# Patient Record
Sex: FEMALE | Race: WHITE | NOT HISPANIC OR LATINO | Employment: UNEMPLOYED | URBAN - METROPOLITAN AREA
[De-identification: names, ages, dates, MRNs, and addresses within clinical notes are randomized per-mention and may not be internally consistent; named-entity substitution may affect disease eponyms.]

---

## 2014-03-27 LAB
CHOLEST SERPL-MCNC: 121 MG/DL
HDLC SERPL-MCNC: 21 MG/DL
LDLC SERPL CALC-MCNC: 79 MG/DL
TRIGL SERPL-MCNC: 105 MG/DL

## 2017-01-06 ENCOUNTER — HOSPITAL ENCOUNTER (EMERGENCY)
Facility: CLINIC | Age: 32
Discharge: HOME | End: 2017-01-06
Attending: PSYCHIATRY & NEUROLOGY | Admitting: PSYCHIATRY & NEUROLOGY
Payer: COMMERCIAL

## 2017-01-06 VITALS
DIASTOLIC BLOOD PRESSURE: 67 MMHG | HEIGHT: 64 IN | RESPIRATION RATE: 16 BRPM | SYSTOLIC BLOOD PRESSURE: 113 MMHG | BODY MASS INDEX: 19.63 KG/M2 | WEIGHT: 115 LBS | TEMPERATURE: 98.1 F | OXYGEN SATURATION: 99 %

## 2017-01-06 DIAGNOSIS — F39 MOOD DISORDER (H): ICD-10-CM

## 2017-01-06 DIAGNOSIS — F10.20 UNCOMPLICATED ALCOHOL DEPENDENCE (H): ICD-10-CM

## 2017-01-06 LAB
ALBUMIN SERPL-MCNC: 3.7 G/DL (ref 3.4–5)
ALCOHOL BREATH TEST: 0 (ref 0–0.01)
ALCOHOL BREATH TEST: 0 (ref 0–0.01)
ALP SERPL-CCNC: 62 U/L (ref 40–150)
ALT SERPL W P-5'-P-CCNC: 17 U/L (ref 0–50)
AMYLASE SERPL-CCNC: 123 U/L (ref 30–110)
ANION GAP SERPL CALCULATED.3IONS-SCNC: 6 MMOL/L (ref 3–14)
AST SERPL W P-5'-P-CCNC: 19 U/L (ref 0–45)
BASOPHILS # BLD AUTO: 0 10E9/L (ref 0–0.2)
BASOPHILS NFR BLD AUTO: 0.3 %
BILIRUB SERPL-MCNC: 0.3 MG/DL (ref 0.2–1.3)
BUN SERPL-MCNC: 9 MG/DL (ref 7–30)
CALCIUM SERPL-MCNC: 8 MG/DL (ref 8.5–10.1)
CHLORIDE SERPL-SCNC: 105 MMOL/L (ref 94–109)
CO2 SERPL-SCNC: 29 MMOL/L (ref 20–32)
CREAT SERPL-MCNC: 0.55 MG/DL (ref 0.52–1.04)
DIFFERENTIAL METHOD BLD: ABNORMAL
EOSINOPHIL # BLD AUTO: 0.4 10E9/L (ref 0–0.7)
EOSINOPHIL NFR BLD AUTO: 4.9 %
ERYTHROCYTE [DISTWIDTH] IN BLOOD BY AUTOMATED COUNT: 15.5 % (ref 10–15)
GFR SERPL CREATININE-BSD FRML MDRD: ABNORMAL ML/MIN/1.7M2
GLUCOSE SERPL-MCNC: 100 MG/DL (ref 70–99)
HCT VFR BLD AUTO: 35.5 % (ref 35–47)
HGB BLD-MCNC: 11.2 G/DL (ref 11.7–15.7)
IMM GRANULOCYTES # BLD: 0 10E9/L (ref 0–0.4)
IMM GRANULOCYTES NFR BLD: 0.3 %
LIPASE SERPL-CCNC: 210 U/L (ref 73–393)
LITHIUM SERPL-SCNC: 0.5 MMOL/L (ref 0.6–1.2)
LYMPHOCYTES # BLD AUTO: 2 10E9/L (ref 0.8–5.3)
LYMPHOCYTES NFR BLD AUTO: 26.8 %
MCH RBC QN AUTO: 28.1 PG (ref 26.5–33)
MCHC RBC AUTO-ENTMCNC: 31.5 G/DL (ref 31.5–36.5)
MCV RBC AUTO: 89 FL (ref 78–100)
MONOCYTES # BLD AUTO: 0.4 10E9/L (ref 0–1.3)
MONOCYTES NFR BLD AUTO: 6 %
NEUTROPHILS # BLD AUTO: 4.5 10E9/L (ref 1.6–8.3)
NEUTROPHILS NFR BLD AUTO: 61.7 %
NRBC # BLD AUTO: 0 10*3/UL
NRBC BLD AUTO-RTO: 0 /100
PLATELET # BLD AUTO: 300 10E9/L (ref 150–450)
POTASSIUM SERPL-SCNC: 4.1 MMOL/L (ref 3.4–5.3)
PROT SERPL-MCNC: 7.4 G/DL (ref 6.8–8.8)
RBC # BLD AUTO: 3.99 10E12/L (ref 3.8–5.2)
SODIUM SERPL-SCNC: 140 MMOL/L (ref 133–144)
WBC # BLD AUTO: 7.3 10E9/L (ref 4–11)

## 2017-01-06 PROCEDURE — 80178 ASSAY OF LITHIUM: CPT | Performed by: PSYCHIATRY & NEUROLOGY

## 2017-01-06 PROCEDURE — 85025 COMPLETE CBC W/AUTO DIFF WBC: CPT | Performed by: PSYCHIATRY & NEUROLOGY

## 2017-01-06 PROCEDURE — 99285 EMERGENCY DEPT VISIT HI MDM: CPT | Mod: 25 | Performed by: PSYCHIATRY & NEUROLOGY

## 2017-01-06 PROCEDURE — 80053 COMPREHEN METABOLIC PANEL: CPT | Performed by: PSYCHIATRY & NEUROLOGY

## 2017-01-06 PROCEDURE — 82150 ASSAY OF AMYLASE: CPT | Performed by: PSYCHIATRY & NEUROLOGY

## 2017-01-06 PROCEDURE — 90791 PSYCH DIAGNOSTIC EVALUATION: CPT

## 2017-01-06 PROCEDURE — 99284 EMERGENCY DEPT VISIT MOD MDM: CPT | Mod: Z6 | Performed by: PSYCHIATRY & NEUROLOGY

## 2017-01-06 PROCEDURE — 36415 COLL VENOUS BLD VENIPUNCTURE: CPT | Performed by: PSYCHIATRY & NEUROLOGY

## 2017-01-06 PROCEDURE — 83690 ASSAY OF LIPASE: CPT | Performed by: PSYCHIATRY & NEUROLOGY

## 2017-01-06 PROCEDURE — 82075 ASSAY OF BREATH ETHANOL: CPT | Performed by: PSYCHIATRY & NEUROLOGY

## 2017-01-06 RX ORDER — LITHIUM CARBONATE 300 MG/1
CAPSULE ORAL
Qty: 90 CAPSULE | Refills: 0 | Status: SHIPPED | OUTPATIENT
Start: 2017-01-06 | End: 2022-02-24

## 2017-01-06 ASSESSMENT — ENCOUNTER SYMPTOMS
DIZZINESS: 0
CHEST TIGHTNESS: 0
SHORTNESS OF BREATH: 0
FEVER: 0
DYSPHORIC MOOD: 1
HALLUCINATIONS: 0
ABDOMINAL PAIN: 1
BACK PAIN: 0
NERVOUS/ANXIOUS: 1

## 2017-01-06 NOTE — ED AVS SNAPSHOT
[facility], [place], Emergency Department    [address]    [facility] [address]    Phone:  [phone]    Fax:  [phone]                                       Amol Franz   MRN: [id number]    Department:  [facility], Emergency Department   Date of Visit:  1/6/2017           After Visit Summary Signature Page     I have received my discharge instructions, and my questions have been answered. I have discussed any challenges I see with this plan with the nurse or doctor.    ..........................................................................................................................................  Patient/Patient Representative Signature      ..........................................................................................................................................  Patient Representative Print Name and Relationship to Patient    ..................................................               ................................................  Date                                            Time    ..........................................................................................................................................  Reviewed by Signature/Title    ...................................................              ..............................................  Date                                                            Time

## 2017-01-06 NOTE — ED AVS SNAPSHOT
[facility], Emergency Department    [address]    [place] [address]    Phone:  [phone]    Fax:  [phone]                                       Amol Franz   MRN: [id number]    Department:  [facility], Emergency Department   Date of Visit:  1/6/2017           Patient Information     Date Of Birth          1985        Your diagnoses for this visit were:     Uncomplicated alcohol dependence (H)     Mood disorder (H)        You were seen by Santos Reed MD.        Discharge Instructions       Go to MICD treatment.  Use the resources given to get into a program.  Call your insurance as they sometimes have the ability to get you into a program.        24 Hour Appointment Hotline       To make an appointment at any [facility] clinic, call [phone] ([phone]). If you don't have a family doctor or clinic, we will help you find one. [facility] clinics are conveniently located to serve the needs of you and your family.             Review of your medicines      Our records show that you are taking the medicines listed below. If these are incorrect, please call your family doctor or clinic.        Dose / Directions Last dose taken    LITHIUM CARBONATE PO   Dose:  300 mg        Take 300 mg by mouth Pt takes 300mg in am and 600mg in evening   Refills:  0                Procedures and tests performed during your visit     Procedure/Test Number of Times Performed    Alcohol breath test POCT 2    Amylase 1    CBC with platelets differential 1    Comprehensive metabolic panel 1    Lipase 1    Lithium level 1      Orders Needing Specimen Collection     Ordered          01/06/17 1957  Drug abuse screen 6 urine (chem dep) - ROUTINE, [facility]: Routine, Needs to be Collected     Scheduled Task Status   01/06/17 1958 Collect Drug abuse screen 6 urine (chem dep) Open   Order Class:  PCU Collect                01/06/17 1957  HCG qualitative urine - STAT, [facility]: STAT, Needs to be Collected      "Scheduled Task Status   17 1958 Collect HCG qualitative urine Open   Order Class:  PCU Collect                  Pending Results     No orders found from 2017 to 2017.            Pending Culture Results     No orders found from 2017 to 2017.            Thank you for choosing [facility]       Thank you for choosing [facility] for your care. Our goal is always to provide you with excellent care. Hearing back from our patients is one way we can continue to improve our services. Please take a few minutes to complete the written survey that you may receive in the mail after you visit with us. Thank you!        [employer] Information     Jumptap lets you send messages to your doctor, view your test results, renew your prescriptions, schedule appointments and more. To sign up, go to [email or web address] . Click on \"Log in\" on the left side of the screen, which will take you to the Welcome page. Then click on \"Sign up Now\" on the right side of the page.     You will be asked to enter the access code listed below, as well as some personal information. Please follow the directions to create your username and password.     Your access code is: [email or web address]  Expires: 2017  8:19 PM     Your access code will  in 90 days. If you need help or a new code, please call your [facility] clinic or [phone].        Care EveryWhere ID     This is your Care EveryWhere ID. This could be used by other organizations to access your [facility] medical records  [id number]        After Visit Summary       This is your record. Keep this with you and show to your community pharmacist(s) and doctor(s) at your next visit.                  "

## 2017-01-07 NOTE — DISCHARGE INSTRUCTIONS
Go to MICD treatment.  Use the resources given to get into a program.  Call your insurance as they sometimes have the ability to get you into a program.    A referral was made to [facility].

## 2017-01-07 NOTE — ED PROVIDER NOTES
History     Chief Complaint   Patient presents with     Addiction Problem     Pt has been drinking for past week. Last drink was at approx 1700. Suicidal ideation, no plan.     Suicidal     The history is provided by the patient and medical records.     Amol Franz is a 31 year old female who comes in due to her feeling that she is losing control.  She states that she is feeling suicidal and that she can't stop using.  She has some sobriety in the past including 2.5 years worth several times.  She relapsed a week ago.  She is drinking.  Her last drink was around 1700 but her breathalyzer is 0.0.  She has no suicidal plan.  She has no withdrawal symptoms.  She feels unsafe in the community and feels hopeless and helpless.      She also states that she thinks she has pancreatitis again.  She has had it 4 times now.  She states she started to feel bloated and tense in her belly after starting to drink again.  She denies any nausea, intense pain or not being able to keep food down.  She states it usually builds from where it feels now and that she usually does not have the anorexia or vomiting with her pancreatitis.      Please see the 's assessment for further details.    I have reviewed the Medications, Allergies, Past Medical and Surgical History, and Social History in the Epic system.    Review of Systems   Constitutional: Negative for fever.   Eyes: Negative for visual disturbance.   Respiratory: Negative for chest tightness and shortness of breath.    Cardiovascular: Negative for chest pain.   Gastrointestinal: Positive for abdominal pain.   Musculoskeletal: Negative for back pain.   Neurological: Negative for dizziness.   Psychiatric/Behavioral: Positive for suicidal ideas and dysphoric mood. Negative for hallucinations and self-injury. The patient is nervous/anxious.    All other systems reviewed and are negative.      Physical Exam   BP: 113/67 mmHg  Heart Rate: 92  Temp: 98.1  F (36.7  C)  Resp:  "16  Height: 162.6 cm (5' 4\")  Weight: 52.164 kg (115 lb)  SpO2: 99 %  Physical Exam   Constitutional: She is oriented to person, place, and time. She appears well-developed and well-nourished.   HENT:   Head: Normocephalic and atraumatic.   Mouth/Throat: Oropharynx is clear and moist.   Eyes: Pupils are equal, round, and reactive to light.   Neck: Normal range of motion. Neck supple.   Cardiovascular: Normal rate, regular rhythm and normal heart sounds.    Pulmonary/Chest: Effort normal and breath sounds normal.   Abdominal: Soft. Bowel sounds are normal. There is no splenomegaly or hepatomegaly. There is tenderness in the right upper quadrant. There is no rigidity, no rebound, no guarding, no tenderness at McBurney's point and negative Cruz's sign.   Musculoskeletal: Normal range of motion.   Neurological: She is alert and oriented to person, place, and time.   Skin: Skin is warm and dry.   Psychiatric: Her speech is normal and behavior is normal. Her mood appears anxious. She is not actively hallucinating. Thought content is not paranoid and not delusional. Cognition and memory are normal. She expresses inappropriate judgment. She exhibits a depressed mood. She expresses suicidal ideation. She expresses no homicidal ideation. She expresses no suicidal plans and no homicidal plans.   Amol is a 30 y/o female who looks her age.  She is well groomed with good eye contact.   Nursing note and vitals reviewed.      ED Course   Procedures        mAol's labs were mostly within normal limits except a slightly high amylase level but nothing that shows that she is having pancreatitis.       Labs Ordered and Resulted from Time of ED Arrival Up to the Time of Departure from the ED   CBC WITH PLATELETS DIFFERENTIAL - Abnormal; Notable for the following:     Hemoglobin 11.2 (*)     RDW 15.5 (*)     All other components within normal limits   LITHIUM LEVEL - Abnormal; Notable for the following:     Lithium Level 0.5 (*)     " All other components within normal limits   ALCOHOL BREATH TEST POCT - Normal   ALCOHOL BREATH TEST POCT - Normal   COMPREHENSIVE METABOLIC PANEL   LIPASE   AMYLASE   DRUG ABUSE SCREEN 6 CHEM DEP URINE ([facility])   HCG QUALITATIVE URINE       Assessments & Plan (with Medical Decision Making)   Amol will be discharged.  She is not an imminent risk to herself or others.  She is higher risk due to her drug use but she admits that she does not want to die and has no suicidal plans.  She is mostly scared about being in the community due to her not being able to stop using.  She was offered for us to call detox facilities including [facility] but she declined.  She was given resources to get into a MICD treatment program.  It was offered to make a referral to our MICD but is not residential and she wants to be in a residential program.  She is medically stable at this point with no withdrawal symptoms.  She has no physical evidence of pancreatitis and no lab evidence either.  If she stays sober, she should feel better.      I have reviewed the nursing notes.    I have reviewed the findings, diagnosis, plan and need for follow up with the patient.  ADDENDUM:  Amol changed her mind and decided she did want the MICD referral to [facility].  One was made.  She was also given a refill of her lithium due to not getting it filled on time.        New Prescriptions    No medications on file       Final diagnoses:   None       1/6/2017   [facility]      Santos Reed MD  01/06/17 2590    Santos Reed MD  01/06/17 6026

## 2017-01-09 ENCOUNTER — TELEPHONE (OUTPATIENT)
Dept: BEHAVIORAL HEALTH | Facility: CLINIC | Age: 32
End: 2017-01-09

## 2017-01-09 NOTE — TELEPHONE ENCOUNTER
D: Writer received a message from pt inquiring about information for either outpt or residential dual Tx. She is hoping to go to whichever she can get into first.     I: Writer LM back for pt. Since that time, a referral from the ED/[facility] have come through.    A: Initially pt reports need for residential Tx and then was open to a referral to outpt DDP prior to leaving the ED.     P: Await call back.    CAROLA Ibarra, [facility]  1/9/2017

## 2017-01-10 ENCOUNTER — TELEPHONE (OUTPATIENT)
Dept: BEHAVIORAL HEALTH | Facility: CLINIC | Age: 32
End: 2017-01-10

## 2017-01-10 NOTE — TELEPHONE ENCOUNTER
D: Pt returned call regarding referral to outpt DDP. Pt reports she has been trying several options for Dual Programs as she has been in about 10 CD Tx's in the past. She is running into either wait lists or inability to take her insurance which is [employer].     I: Writer explained [facility] has a current wait list. There would be a chance of starting her in the 9 am track as everyone is waiting for an 11, however, the next opening is not yet confirmed.    A: Pt reports she only has 2 weeks of TERRA. She can extend this for about one month of Tx, but only if she is able to start something right away. She adds that she is concerned about programming that is only 3 hrs per day. Her kids are staying with their fathers and pt reports she would have too much down time increasing her risk of use. She also wants to avoid going to far away in case something happens with her kids and she needs to be more readily available. Pt reports feeling frustrated and concerned, yet she is also reluctant to compromise what she is looking for.    P: Offered pt a couple of Dual Program options that might meet her needs, however, suggested she first contact [facility] to request a list of providers within her network and to ask them to help her narrow her search. Writer encouraged pt to call back if she changed her mind about wanting to pursue Outpt DDP at .    CAROLA Ibarra, [facility]  1/10/2017

## 2017-03-07 ENCOUNTER — AMBULATORY - HEALTHEAST (OUTPATIENT)
Dept: LAB | Facility: CLINIC | Age: 32
End: 2017-03-07

## 2017-03-07 DIAGNOSIS — D64.9 ANEMIA: ICD-10-CM

## 2017-03-07 DIAGNOSIS — F31.81 BIPOLAR II DISORDER (H): ICD-10-CM

## 2017-03-07 LAB — HBA1C MFR BLD: 5.5 % (ref 3.5–6)

## 2017-03-10 ENCOUNTER — OFFICE VISIT - HEALTHEAST (OUTPATIENT)
Dept: FAMILY MEDICINE | Facility: CLINIC | Age: 32
End: 2017-03-10

## 2017-03-10 ENCOUNTER — AMBULATORY - HEALTHEAST (OUTPATIENT)
Dept: FAMILY MEDICINE | Facility: CLINIC | Age: 32
End: 2017-03-10

## 2017-03-10 ENCOUNTER — HOSPITAL ENCOUNTER (OUTPATIENT)
Dept: LAB | Age: 32
Setting detail: SPECIMEN
Discharge: HOME | End: 2017-03-10

## 2017-03-10 DIAGNOSIS — N39.0 UTI (URINARY TRACT INFECTION): ICD-10-CM

## 2017-03-10 ASSESSMENT — MIFFLIN-ST. JEOR: SCORE: 1223.85

## 2017-03-13 ENCOUNTER — COMMUNICATION - HEALTHEAST (OUTPATIENT)
Dept: FAMILY MEDICINE | Facility: CLINIC | Age: 32
End: 2017-03-13

## 2017-03-15 ENCOUNTER — AMBULATORY - HEALTHEAST (OUTPATIENT)
Dept: LAB | Facility: CLINIC | Age: 32
End: 2017-03-15

## 2017-03-15 DIAGNOSIS — F31.81 BIPOLAR II DISORDER (H): ICD-10-CM

## 2017-05-17 ENCOUNTER — AMBULATORY - HEALTHEAST (OUTPATIENT)
Dept: LAB | Facility: CLINIC | Age: 32
End: 2017-05-17

## 2017-05-17 DIAGNOSIS — F31.60 MIXED BIPOLAR I DISORDER (H): ICD-10-CM

## 2017-05-17 LAB — HBA1C MFR BLD: 5.6 % (ref 3.5–6)

## 2017-07-11 ENCOUNTER — COMMUNICATION - HEALTHEAST (OUTPATIENT)
Dept: SCHEDULING | Facility: CLINIC | Age: 32
End: 2017-07-11

## 2017-07-11 ENCOUNTER — COMMUNICATION - HEALTHEAST (OUTPATIENT)
Dept: FAMILY MEDICINE | Facility: CLINIC | Age: 32
End: 2017-07-11

## 2017-07-12 ENCOUNTER — AMBULATORY - HEALTHEAST (OUTPATIENT)
Dept: FAMILY MEDICINE | Facility: CLINIC | Age: 32
End: 2017-07-12

## 2017-07-12 ENCOUNTER — OFFICE VISIT (OUTPATIENT)
Dept: URGENT CARE | Facility: URGENT CARE | Age: 32
End: 2017-07-12
Payer: COMMERCIAL

## 2017-07-12 VITALS
BODY MASS INDEX: 19.63 KG/M2 | OXYGEN SATURATION: 99 % | DIASTOLIC BLOOD PRESSURE: 60 MMHG | WEIGHT: 115 LBS | TEMPERATURE: 98.4 F | HEIGHT: 64 IN | HEART RATE: 77 BPM | SYSTOLIC BLOOD PRESSURE: 92 MMHG

## 2017-07-12 DIAGNOSIS — R30.0 DYSURIA: Primary | ICD-10-CM

## 2017-07-12 DIAGNOSIS — R82.90 NONSPECIFIC FINDING ON EXAMINATION OF URINE: ICD-10-CM

## 2017-07-12 LAB
ALBUMIN UR-MCNC: NEGATIVE MG/DL
APPEARANCE UR: ABNORMAL
BACTERIA #/AREA URNS HPF: ABNORMAL /HPF
BILIRUB UR QL STRIP: NEGATIVE
COLOR UR AUTO: YELLOW
GLUCOSE UR STRIP-MCNC: NEGATIVE MG/DL
HGB UR QL STRIP: ABNORMAL
KETONES UR STRIP-MCNC: NEGATIVE MG/DL
LEUKOCYTE ESTERASE UR QL STRIP: ABNORMAL
MICRO REPORT STATUS: NORMAL
NITRATE UR QL: POSITIVE
NON-SQ EPI CELLS #/AREA URNS LPF: ABNORMAL /LPF
PH UR STRIP: 5.5 PH (ref 5–7)
RBC #/AREA URNS AUTO: ABNORMAL /HPF (ref 0–2)
SP GR UR STRIP: 1.01 (ref 1–1.03)
SPECIMEN SOURCE: NORMAL
URN SPEC COLLECT METH UR: ABNORMAL
UROBILINOGEN UR STRIP-ACNC: 0.2 EU/DL (ref 0.2–1)
WBC #/AREA URNS AUTO: ABNORMAL /HPF (ref 0–2)
WET PREP SPEC: NORMAL

## 2017-07-12 PROCEDURE — 87088 URINE BACTERIA CULTURE: CPT | Performed by: INTERNAL MEDICINE

## 2017-07-12 PROCEDURE — 87210 SMEAR WET MOUNT SALINE/INK: CPT | Performed by: INTERNAL MEDICINE

## 2017-07-12 PROCEDURE — 87491 CHLMYD TRACH DNA AMP PROBE: CPT | Performed by: INTERNAL MEDICINE

## 2017-07-12 PROCEDURE — 81001 URINALYSIS AUTO W/SCOPE: CPT | Performed by: INTERNAL MEDICINE

## 2017-07-12 PROCEDURE — 99213 OFFICE O/P EST LOW 20 MIN: CPT | Performed by: FAMILY MEDICINE

## 2017-07-12 PROCEDURE — 87086 URINE CULTURE/COLONY COUNT: CPT | Performed by: INTERNAL MEDICINE

## 2017-07-12 PROCEDURE — 87591 N.GONORRHOEAE DNA AMP PROB: CPT | Performed by: INTERNAL MEDICINE

## 2017-07-12 PROCEDURE — 87186 SC STD MICRODIL/AGAR DIL: CPT | Performed by: INTERNAL MEDICINE

## 2017-07-12 RX ORDER — NITROFURANTOIN 25; 75 MG/1; MG/1
100 CAPSULE ORAL 2 TIMES DAILY
Qty: 14 CAPSULE | Refills: 0 | Status: SHIPPED | OUTPATIENT
Start: 2017-07-12 | End: 2017-11-01

## 2017-07-12 NOTE — NURSING NOTE
"Chief Complaint   Patient presents with     Urgent Care     Dysuria      Reports frequent UTI's but not since February.  2 days ago started to have urinary frequency and burning with urination. No unusual vaginal discharge.       Initial BP 92/60  Pulse 77  Temp 98.4  F (36.9  C) (Oral)  Ht 5' 4\" (1.626 m)  Wt 115 lb (52.2 kg)  LMP 07/01/2017 (Exact Date)  SpO2 99%  BMI 19.74 kg/m2 Estimated body mass index is 19.74 kg/(m^2) as calculated from the following:    Height as of this encounter: 5' 4\" (1.626 m).    Weight as of this encounter: 115 lb (52.2 kg)..  BP completed using cuff size: sergio Armenta RN  "

## 2017-07-12 NOTE — MR AVS SNAPSHOT
"              After Visit Summary   2017    Amol Franz    MRN: [id number]           Patient Information     Date Of Birth          1985        Visit Information        Provider Department      2017 5:15 PM Mario Alberto West MD [facility]        Today's Diagnoses     UTI    -  1    Nonspecific finding on examination of urine          Care Instructions    7 days of medication. Take twice daily.               Follow-ups after your visit        Who to contact     If you have questions or need follow up information about today's clinic visit or your schedule please contact [facility] directly at [phone].  Normal or non-critical lab and imaging results will be communicated to you by ePantryhart, letter or phone within 4 business days after the clinic has received the results. If you do not hear from us within 7 days, please contact the clinic through ePantryhart or phone. If you have a critical or abnormal lab result, we will notify you by phone as soon as possible.  Submit refill requests through [employer] or call your pharmacy and they will forward the refill request to us. Please allow 3 business days for your refill to be completed.          Additional Information About Your Visit        MyChart Information     Job[employer] lets you send messages to your doctor, view your test results, renew your prescriptions, schedule appointments and more. To sign up, go to [email or web address] . Click on \"Log in\" on the left side of the screen, which will take you to the Welcome page. Then click on \"Sign up Now\" on the right side of the page.     You will be asked to enter the access code listed below, as well as some personal information. Please follow the directions to create your username and password.     Your access code is: [email or web address]  Expires: 10/10/2017  5:54 PM     Your access code will  in 90 days. If you need help or a new code, please call your " "[facility] or [phone].        Care EveryWhere ID     This is your Care EveryWhere ID. This could be used by other organizations to access your [facility] medical records  [id number]        Your Vitals Were     Pulse Temperature Height Last Period Pulse Oximetry BMI (Body Mass Index)    77 98.4  F (36.9  C) (Oral) 5' 4\" (1.626 m) 07/01/2017 (Exact Date) 99% 19.74 kg/m2       Blood Pressure from Last 3 Encounters:   07/12/17 92/60   01/06/17 113/67   10/25/16 118/66    Weight from Last 3 Encounters:   07/12/17 115 lb (52.2 kg)   01/06/17 115 lb (52.2 kg)   10/25/16 116 lb 12.8 oz (53 kg)              We Performed the Following     *UA reflex to Microscopic and Culture ([facility] and [facility] (except [place] and [place])     Chlamydia trachomatis PCR     Neisseria gonorrhoeae PCR     Urine Culture Aerobic Bacterial     Urine Microscopic     Wet prep          Today's Medication Changes          These changes are accurate as of: 7/12/17  5:54 PM.  If you have any questions, ask your nurse or doctor.               Start taking these medicines.        Dose/Directions    nitroFURantoin (macrocrystal-monohydrate) 100 MG capsule   Commonly known as:  MACROBID   Used for:  Dysuria   Started by:  Mario Alberto West MD        Dose:  100 mg   Take 1 capsule (100 mg) by mouth 2 times daily   Quantity:  14 capsule   Refills:  0            Where to get your medicines      These medications were sent to [facility] - [place], [place] - [address]  [address], [place] [address]     Phone:  [phone]     nitroFURantoin (macrocrystal-monohydrate) 100 MG capsule                Primary Care Provider    Physician No Ref-Primary       No address on file        Equal Access to Services     CASSY BARTON AH: Kimmie Aquino, waaxda luqadaha, qaybta kaalmada adeegyada, livia kelly. So [facility] [phone].    ATENCIÓN: Si kobela esptejal, tiene a mata " disposición servicios gratuitos de asistencia lingüística. Carla pinzon [phone].    We comply with applicable federal civil rights laws and [place] laws. We do not discriminate on the basis of race, color, national origin, age, disability sex, sexual orientation or gender identity.            Thank you!     Thank you for choosing [facility]  for your care. Our goal is always to provide you with excellent care. Hearing back from our patients is one way we can continue to improve our services. Please take a few minutes to complete the written survey that you may receive in the mail after your visit with us. Thank you!             Your Updated Medication List - Protect others around you: Learn how to safely use, store and throw away your medicines at www.disposemymeds.org.          This list is accurate as of: 7/12/17  5:54 PM.  Always use your most recent med list.                   Brand Name Dispense Instructions for use Diagnosis    lithium 300 MG capsule     90 capsule    Pt takes 300mg in am and 600mg in evening        nitroFURantoin (macrocrystal-monohydrate) 100 MG capsule    MACROBID    14 capsule    Take 1 capsule (100 mg) by mouth 2 times daily    Dysuria       TRILEPTAL PO

## 2017-07-12 NOTE — PROGRESS NOTES
"Nursing Evaluation:   Nursing Notes:   Vanessa Sidhu RN  7/12/2017  5:43 PM  Signed  Chief Complaint   Patient presents with     Urgent Care     Dysuria      Reports frequent UTI's but not since February.  2 days ago started to have urinary frequency and burning with urination. No unusual vaginal discharge.       Initial BP 92/60  Pulse 77  Temp 98.4  F (36.9  C) (Oral)  Ht 5' 4\" (1.626 m)  Wt 115 lb (52.2 kg)  LMP 07/01/2017 (Exact Date)  SpO2 99%  BMI 19.74 kg/m2 Estimated body mass index is 19.74 kg/(m^2) as calculated from the following:    Height as of this encounter: 5' 4\" (1.626 m).    Weight as of this encounter: 115 lb (52.2 kg)..  BP completed using cuff size: small regular  FELIX Armenta      Subjective:   Amol Franz is a 31 year old female who presents for   Chief Complaint   Patient presents with     Urgent Care     Dysuria      Reports frequent UTI's but not since February.  2 days ago started to have urinary frequency and burning with urination. No unusual vaginal discharge.     No nausea/vomiting/diarrhea. Symptoms started a couple day ago typical of her other infections which includes increased frequency and slight burning with urination. No blood seen. No fevers. Thinks her fluid intake is pretty good. Eating well.     PMHX/PSHX/MEDS/ALLERGIES/SHX/FHX reviewed and updated in Epic.    There are no active problems to display for this patient.      Current Outpatient Prescriptions   Medication     OXcarbazepine (TRILEPTAL PO)     lithium 300 MG capsule     No current facility-administered medications for this visit.        ROS:  As above per HPI    Objective:   BP 92/60  Pulse 77  Temp 98.4  F (36.9  C) (Oral)  Ht 5' 4\" (1.626 m)  Wt 115 lb (52.2 kg)  LMP 07/01/2017 (Exact Date)  SpO2 99%  BMI 19.74 kg/m2, Body mass index is 19.74 kg/(m^2).  Gen:  NAD, well-nourished, sitting in chair comfortably  HEENT: PERRLA; nares patent; oropharynx pink and moist  Neck: supple without " lymphadenopathy, trachea midline  CV:  Hemodynamically stable  BACK: no CVA tenderness  Pulm: non-labored  ABD: non distended, soft  Extrem: no cyanosis, edema or clubbing  Skin: no obvious rashes or abnormalities  Psych: Euthymic, linear thoughts, normal rate of speech, good judgment       Results for orders placed or performed in visit on 07/12/17   *UA reflex to Microscopic and Culture ([place] and [facility] (except [place] and [place])   Result Value Ref Range    Color Urine Yellow     Appearance Urine Slightly Cloudy     Glucose Urine Negative NEG mg/dL    Bilirubin Urine Negative NEG    Ketones Urine Negative NEG mg/dL    Specific Gravity Urine 1.015 1.003 - 1.035    Blood Urine Small (A) NEG    pH Urine 5.5 5.0 - 7.0 pH    Protein Albumin Urine Negative NEG mg/dL    Urobilinogen Urine 0.2 0.2 - 1.0 EU/dL    Nitrite Urine Positive (A) NEG    Leukocyte Esterase Urine Large (A) NEG    Source Midstream Urine    Urine Microscopic   Result Value Ref Range    WBC Urine  (A) 0 - 2 /HPF    RBC Urine 5-10 (A) 0 - 2 /HPF    Squamous Epithelial /LPF Urine Moderate (A) FEW /LPF    Bacteria Urine Many (A) NEG /HPF   Wet prep   Result Value Ref Range    Specimen Description Vagina     Wet Prep       No Trichomonas seen  No clue cells seen  No yeast seen      Micro Report Status FINAL 07/12/2017        Assesment & Plan:   Amol Franz, 31 year old female who presents with:  (R30.0) UTI  (primary encounter diagnosis)  Comment: Wet prep negative. Positive for Urinary tract infection, no symptoms of pyelonephritis. Macrobid x 7 days. Dysuria very minor, she elects not to take pyridium. Recommended good fluid hydration and to urinate before and after intercourse.   Plan: *UA reflex to Microscopic and Culture ([facility]         and [facility] (except [place] and         [place]), Wet prep, Chlamydia trachomatis PCR,         Neisseria gonorrhoeae PCR, Urine Microscopic            (R82.90) Nonspecific  finding on examination of urine  Comment:   Plan: Urine Culture Aerobic Bacterial     Options for treatment and/or follow-up care were reviewed with the patient. Amol DUTCH Franz and/or legal guardian was engaged and actively involved in the decision making process. Patient/guardian verbalized understanding of the options discussed and was satisfied with the final plan.      Mario Alberto Wets MD PGY3  [phone] (Pager)  [facility]

## 2017-07-13 LAB
C TRACH DNA SPEC QL NAA+PROBE: NORMAL
N GONORRHOEA DNA SPEC QL NAA+PROBE: NORMAL
SPECIMEN SOURCE: NORMAL
SPECIMEN SOURCE: NORMAL

## 2017-07-14 LAB
BACTERIA SPEC CULT: ABNORMAL
MICRO REPORT STATUS: ABNORMAL
MICROORGANISM SPEC CULT: ABNORMAL
SPECIMEN SOURCE: ABNORMAL

## 2017-10-04 ENCOUNTER — OFFICE VISIT - HEALTHEAST (OUTPATIENT)
Dept: INTERNAL MEDICINE | Facility: CLINIC | Age: 32
End: 2017-10-04

## 2017-10-04 DIAGNOSIS — F41.1 ANXIETY STATE: ICD-10-CM

## 2017-10-04 DIAGNOSIS — F10.20 ALCOHOL USE DISORDER, MODERATE, IN CONTROLLED ENVIRONMENT, DEPENDENCE (H): ICD-10-CM

## 2017-10-04 DIAGNOSIS — F31.81 BIPOLAR 2 DISORDER, MAJOR DEPRESSIVE EPISODE (H): ICD-10-CM

## 2017-10-04 ASSESSMENT — MIFFLIN-ST. JEOR: SCORE: 1245.85

## 2017-11-01 ENCOUNTER — OFFICE VISIT (OUTPATIENT)
Dept: URGENT CARE | Facility: URGENT CARE | Age: 32
End: 2017-11-01
Payer: COMMERCIAL

## 2017-11-01 VITALS
OXYGEN SATURATION: 100 % | HEIGHT: 64 IN | BODY MASS INDEX: 20.14 KG/M2 | TEMPERATURE: 98.1 F | WEIGHT: 118 LBS | HEART RATE: 53 BPM | SYSTOLIC BLOOD PRESSURE: 96 MMHG | DIASTOLIC BLOOD PRESSURE: 62 MMHG

## 2017-11-01 DIAGNOSIS — R30.0 DYSURIA: ICD-10-CM

## 2017-11-01 DIAGNOSIS — N30.00 ACUTE CYSTITIS WITHOUT HEMATURIA: Primary | ICD-10-CM

## 2017-11-01 LAB
ALBUMIN UR-MCNC: NEGATIVE MG/DL
APPEARANCE UR: CLEAR
BILIRUB UR QL STRIP: NEGATIVE
COLOR UR AUTO: YELLOW
GLUCOSE UR STRIP-MCNC: NEGATIVE MG/DL
HGB UR QL STRIP: ABNORMAL
KETONES UR STRIP-MCNC: NEGATIVE MG/DL
LEUKOCYTE ESTERASE UR QL STRIP: ABNORMAL
NITRATE UR QL: NEGATIVE
NON-SQ EPI CELLS #/AREA URNS LPF: NORMAL /LPF
PH UR STRIP: 7 PH (ref 5–7)
RBC #/AREA URNS AUTO: NORMAL /HPF
SOURCE: ABNORMAL
SP GR UR STRIP: 1.01 (ref 1–1.03)
UROBILINOGEN UR STRIP-ACNC: 0.2 EU/DL (ref 0.2–1)
WBC #/AREA URNS AUTO: NORMAL /HPF

## 2017-11-01 PROCEDURE — 81001 URINALYSIS AUTO W/SCOPE: CPT | Performed by: INTERNAL MEDICINE

## 2017-11-01 PROCEDURE — 99213 OFFICE O/P EST LOW 20 MIN: CPT | Performed by: INTERNAL MEDICINE

## 2017-11-01 RX ORDER — NITROFURANTOIN 25; 75 MG/1; MG/1
100 CAPSULE ORAL 2 TIMES DAILY
Qty: 10 CAPSULE | Refills: 0 | Status: SHIPPED | OUTPATIENT
Start: 2017-11-01 | End: 2017-11-06

## 2017-11-01 NOTE — PROGRESS NOTES
"SUBJECTIVE:   Amol Franz is a 31 year old female who  presents today for a possible UTI.   Chief Complaint   Patient presents with     Urgent Care     Dysuria     Hx frequent UTI's; started to have urinary frequency and burning the last few days.       Symptoms of dysuria and frequency have been going on for 3day(s).  Hematuria no.  gradual onset    There is no history of fever, chills, nausea or vomiting.  No history of vaginal  discharge. This patient does  have a history of urinary tract infections.   Patient denies flank pain or vaginal discharge     Past Medical History:   Diagnosis Date     Bipolar 1 disorder (H)      Current Outpatient Prescriptions   Medication Sig Dispense Refill     RISPERIDONE PO Take by mouth daily       FLUoxetine HCl (PROZAC PO) Take by mouth daily       nitroFURantoin, macrocrystal-monohydrate, (MACROBID) 100 MG capsule Take 1 capsule (100 mg) by mouth 2 times daily for 5 days 10 capsule 0     OXcarbazepine (TRILEPTAL PO)        lithium 300 MG capsule Pt takes 300mg in am and 600mg in evening (Patient not taking: Reported on 7/12/2017) 90 capsule 0     Social History   Substance Use Topics     Smoking status: Former Smoker     Smokeless tobacco: Current User      Comment: Reports smoking e-cigarettes daily.     Alcohol use 0.0 oz/week     0 Standard drinks or equivalent per week      Comment: has been drinking 1btl plus of wine per day       OBJECTIVE:  BP 96/62  Pulse 53  Temp 98.1  F (36.7  C) (Oral)  Ht 5' 4\" (1.626 m)  Wt 118 lb (53.5 kg)  LMP 10/25/2017 (Approximate)  SpO2 100%  BMI 20.25 kg/m2  GENERAL APPEARANCE: healthy, alert and no distress  ABDOMEN:  soft, nontender  BACK: No CVA tenderness    ASSESSMENT:       ICD-10-CM    1. Acute cystitis without hematuria N30.00 nitroFURantoin, macrocrystal-monohydrate, (MACROBID) 100 MG capsule   2. Dysuria R30.0 *UA reflex to Microscopic and Culture ([facility] (except [place] and [place])     Urine " Microscopic     As symptoms very similar to her UTIs, will treat  Discussed urine is not too remarkable    PLAN:  As per ordered above  Drink plenty of fluids.  Prevention and treatment of UTI's discussed.Signs and symptoms of pyelonephritis mentioned.  Follow up with primary care physician if not improving    Patient Instructions           Component      Latest Ref Rng & Units 11/1/2017   Color Urine       Yellow   Appearance Urine       Clear   Glucose Urine      NEG:Negative mg/dL Negative   Bilirubin Urine      NEG:Negative Negative   Ketones Urine      NEG:Negative mg/dL Negative   Specific Gravity Urine      1.003 - 1.035 1.010   Blood Urine      NEG:Negative Trace (A)   pH Urine      5.0 - 7.0 pH 7.0   Protein Albumin Urine      NEG:Negative mg/dL Negative   Urobilinogen Urine      0.2 - 1.0 EU/dL 0.2   Nitrite Urine      NEG:Negative Negative   Leukocyte Esterase Urine      NEG:Negative Small (A)   Source       Midstream Urine   WBC Urine      OTO2:O - 2 /HPF O - 2   RBC Urine      OTO2:O - 2 /HPF O - 2   Squamous Epithelial /LPF Urine      FEW:Few /LPF Few

## 2017-11-01 NOTE — PATIENT INSTRUCTIONS
Component      Latest Ref Rng & Units 11/1/2017   Color Urine       Yellow   Appearance Urine       Clear   Glucose Urine      NEG:Negative mg/dL Negative   Bilirubin Urine      NEG:Negative Negative   Ketones Urine      NEG:Negative mg/dL Negative   Specific Gravity Urine      1.003 - 1.035 1.010   Blood Urine      NEG:Negative Trace (A)   pH Urine      5.0 - 7.0 pH 7.0   Protein Albumin Urine      NEG:Negative mg/dL Negative   Urobilinogen Urine      0.2 - 1.0 EU/dL 0.2   Nitrite Urine      NEG:Negative Negative   Leukocyte Esterase Urine      NEG:Negative Small (A)   Source       Midstream Urine   WBC Urine      OTO2:O - 2 /HPF O - 2   RBC Urine      OTO2:O - 2 /HPF O - 2   Squamous Epithelial /LPF Urine      FEW:Few /LPF Few

## 2017-11-01 NOTE — MR AVS SNAPSHOT
After Visit Summary   11/1/2017    Amol Franz    MRN: [id number]           Patient Information     Date Of Birth          1985        Visit Information        Provider Department      11/1/2017 5:15 PM Chantal Boothe MD [facility]        Today's Diagnoses     Acute cystitis without hematuria    -  1    Dysuria          Care Instructions          Component      Latest Ref Rng & Units 11/1/2017   Color Urine       Yellow   Appearance Urine       Clear   Glucose Urine      NEG:Negative mg/dL Negative   Bilirubin Urine      NEG:Negative Negative   Ketones Urine      NEG:Negative mg/dL Negative   Specific Gravity Urine      1.003 - 1.035 1.010   Blood Urine      NEG:Negative Trace (A)   pH Urine      5.0 - 7.0 pH 7.0   Protein Albumin Urine      NEG:Negative mg/dL Negative   Urobilinogen Urine      0.2 - 1.0 EU/dL 0.2   Nitrite Urine      NEG:Negative Negative   Leukocyte Esterase Urine      NEG:Negative Small (A)   Source       Midstream Urine   WBC Urine      OTO2:O - 2 /HPF O - 2   RBC Urine      OTO2:O - 2 /HPF O - 2   Squamous Epithelial /LPF Urine      FEW:Few /LPF Few             Follow-ups after your visit        Who to contact     If you have questions or need follow up information about today's clinic visit or your schedule please contact [facility] directly at [phone].  Normal or non-critical lab and imaging results will be communicated to you by MyChart, letter or phone within 4 business days after the clinic has received the results. If you do not hear from us within 7 days, please contact the clinic through MyChart or phone. If you have a critical or abnormal lab result, we will notify you by phone as soon as possible.  Submit refill requests through [employer] or call your pharmacy and they will forward the refill request to us. Please allow 3 business days for your refill to be completed.          Additional Information  "About Your Visit        MyChart Information     [employer] lets you send messages to your doctor, view your test results, renew your prescriptions, schedule appointments and more. To sign up, go to [email or web address] . Click on \"Log in\" on the left side of the screen, which will take you to the Welcome page. Then click on \"Sign up Now\" on the right side of the page.     You will be asked to enter the access code listed below, as well as some personal information. Please follow the directions to create your username and password.     Your access code is: [email or web address]  Expires: 2018  5:46 PM     Your access code will  in 90 days. If you need help or a new code, please call your [facility] clinic or [phone].        Care EveryWhere ID     This is your Care EveryWhere ID. This could be used by other organizations to access your [facility] medical records  [id number]        Your Vitals Were     Pulse Temperature Height Last Period Pulse Oximetry BMI (Body Mass Index)    53 98.1  F (36.7  C) (Oral) 5' 4\" (1.626 m) 10/25/2017 (Approximate) 100% 20.25 kg/m2       Blood Pressure from Last 3 Encounters:   17 96/62   17 92/60   17 113/67    Weight from Last 3 Encounters:   17 118 lb (53.5 kg)   17 115 lb (52.2 kg)   17 115 lb (52.2 kg)              We Performed the Following     *UA reflex to Microscopic and Culture ([place] [facility] (except [place] and [place])     Urine Microscopic          Today's Medication Changes          These changes are accurate as of: 17  5:46 PM.  If you have any questions, ask your nurse or doctor.               Start taking these medicines.        Dose/Directions    nitroFURantoin (macrocrystal-monohydrate) 100 MG capsule   Commonly known as:  MACROBID   Used for:  Acute cystitis without hematuria   Started by:  Chantal Boothe MD        Dose:  100 mg   Take 1 capsule (100 mg) by mouth 2 times daily for 5 days "   Quantity:  10 capsule   Refills:  0            Where to get your medicines      These medications were sent to [facility] - [place], [place] - [address]  [address], [place] [address]     Phone:  [phone]     nitroFURantoin (macrocrystal-monohydrate) 100 MG capsule                Primary Care Provider Office Phone # Fax #    [facility] [phone]       [address] [place] [address]        Equal Access to Services     CASSY BARTON : Hadii aad ku hadasho Soomaali, waaxda luqadaha, qaybta kaalmada adeegyada, waxay idiin hayaan adeeg kharash danish . So [facility] [phone].    ATENCIÓN: Si habla español, tiene a mata disposición servicios gratuitos de asistencia lingüística. Ll[facility] [phone].    We comply with applicable federal civil rights laws and [place] laws. We do not discriminate on the basis of race, color, national origin, age, disability, sex, sexual orientation, or gender identity.            Thank you!     Thank you for choosing [facility]  for your care. Our goal is always to provide you with excellent care. Hearing back from our patients is one way we can continue to improve our services. Please take a few minutes to complete the written survey that you may receive in the mail after your visit with us. Thank you!             Your Updated Medication List - Protect others around you: Learn how to safely use, store and throw away your medicines at www.disposemymeds.org.          This list is accurate as of: 11/1/17  5:46 PM.  Always use your most recent med list.                   Brand Name Dispense Instructions for use Diagnosis    lithium 300 MG capsule     90 capsule    Pt takes 300mg in am and 600mg in evening        nitroFURantoin (macrocrystal-monohydrate) 100 MG capsule    MACROBID    10 capsule    Take 1 capsule (100 mg) by mouth 2 times daily for 5 days    Acute cystitis without hematuria        PROZAC PO      Take by mouth daily        RISPERIDONE PO      Take by mouth daily        TRILEPTAL PO

## 2017-11-01 NOTE — NURSING NOTE
"Chief Complaint   Patient presents with     Urgent Care     Dysuria     Hx frequent UTI's; started to have urinary frequency and burning the last few days.       Initial BP 96/62  Pulse 53  Temp 98.1  F (36.7  C) (Oral)  Ht 5' 4\" (1.626 m)  Wt 118 lb (53.5 kg)  LMP 10/25/2017 (Approximate)  SpO2 100%  BMI 20.25 kg/m2 Estimated body mass index is 20.25 kg/(m^2) as calculated from the following:    Height as of this encounter: 5' 4\" (1.626 m).    Weight as of this encounter: 118 lb (53.5 kg)..  BP completed using cuff size: small robert Armenta RN  "

## 2018-01-28 ENCOUNTER — RECORDS - HEALTHEAST (OUTPATIENT)
Dept: ADMINISTRATIVE | Facility: OTHER | Age: 33
End: 2018-01-28

## 2018-01-29 ENCOUNTER — RECORDS - HEALTHEAST (OUTPATIENT)
Dept: ADMINISTRATIVE | Facility: OTHER | Age: 33
End: 2018-01-29

## 2018-03-24 ENCOUNTER — RECORDS - HEALTHEAST (OUTPATIENT)
Dept: ADMINISTRATIVE | Facility: OTHER | Age: 33
End: 2018-03-24

## 2018-04-17 ENCOUNTER — OFFICE VISIT - HEALTHEAST (OUTPATIENT)
Dept: FAMILY MEDICINE | Facility: CLINIC | Age: 33
End: 2018-04-17

## 2018-04-17 DIAGNOSIS — F43.10 PTSD (POST-TRAUMATIC STRESS DISORDER): ICD-10-CM

## 2018-04-17 DIAGNOSIS — F31.9 BIPOLAR 1 DISORDER, DEPRESSED (H): ICD-10-CM

## 2018-04-17 LAB
ALBUMIN SERPL-MCNC: 3.9 G/DL (ref 3.5–5)
ALP SERPL-CCNC: 60 U/L (ref 45–120)
ALT SERPL W P-5'-P-CCNC: 32 U/L (ref 0–45)
ANION GAP SERPL CALCULATED.3IONS-SCNC: 8 MMOL/L (ref 5–18)
AST SERPL W P-5'-P-CCNC: 34 U/L (ref 0–40)
BASOPHILS # BLD AUTO: 0 THOU/UL (ref 0–0.2)
BASOPHILS NFR BLD AUTO: 0 % (ref 0–2)
BILIRUB SERPL-MCNC: 0.3 MG/DL (ref 0–1)
BUN SERPL-MCNC: 9 MG/DL (ref 8–22)
CALCIUM SERPL-MCNC: 9.2 MG/DL (ref 8.5–10.5)
CHLORIDE BLD-SCNC: 103 MMOL/L (ref 98–107)
CO2 SERPL-SCNC: 29 MMOL/L (ref 22–31)
CREAT SERPL-MCNC: 0.64 MG/DL (ref 0.6–1.1)
EOSINOPHIL # BLD AUTO: 0.3 THOU/UL (ref 0–0.4)
EOSINOPHIL NFR BLD AUTO: 5 % (ref 0–6)
ERYTHROCYTE [DISTWIDTH] IN BLOOD BY AUTOMATED COUNT: 17.8 % (ref 11–14.5)
GFR SERPL CREATININE-BSD FRML MDRD: >60 ML/MIN/1.73M2
GLUCOSE BLD-MCNC: 84 MG/DL (ref 70–125)
HBA1C MFR BLD: 5.2 % (ref 3.5–6)
HCT VFR BLD AUTO: 35.8 % (ref 35–47)
HGB BLD-MCNC: 11.8 G/DL (ref 12–16)
LITHIUM SERPL-SCNC: 0.5 MMOL/L (ref 0.6–1.2)
LYMPHOCYTES # BLD AUTO: 1.7 THOU/UL (ref 0.8–4.4)
LYMPHOCYTES NFR BLD AUTO: 31 % (ref 20–40)
MCH RBC QN AUTO: 26.4 PG (ref 27–34)
MCHC RBC AUTO-ENTMCNC: 32.9 G/DL (ref 32–36)
MCV RBC AUTO: 80 FL (ref 80–100)
MONOCYTES # BLD AUTO: 0.4 THOU/UL (ref 0–0.9)
MONOCYTES NFR BLD AUTO: 7 % (ref 2–10)
NEUTROPHILS # BLD AUTO: 3.1 THOU/UL (ref 2–7.7)
NEUTROPHILS NFR BLD AUTO: 57 % (ref 50–70)
PLATELET # BLD AUTO: 309 THOU/UL (ref 140–440)
PMV BLD AUTO: 6.9 FL (ref 7–10)
POTASSIUM BLD-SCNC: 3.9 MMOL/L (ref 3.5–5)
PROT SERPL-MCNC: 7.3 G/DL (ref 6–8)
RBC # BLD AUTO: 4.46 MILL/UL (ref 3.8–5.4)
SODIUM SERPL-SCNC: 140 MMOL/L (ref 136–145)
TSH SERPL DL<=0.005 MIU/L-ACNC: 1.41 UIU/ML (ref 0.3–5)
WBC: 5.5 THOU/UL (ref 4–11)

## 2018-04-17 ASSESSMENT — MIFFLIN-ST. JEOR: SCORE: 1201.17

## 2018-04-19 ENCOUNTER — COMMUNICATION - HEALTHEAST (OUTPATIENT)
Dept: FAMILY MEDICINE | Facility: CLINIC | Age: 33
End: 2018-04-19

## 2018-05-29 ENCOUNTER — AMBULATORY - HEALTHEAST (OUTPATIENT)
Dept: FAMILY MEDICINE | Facility: CLINIC | Age: 33
End: 2018-05-29

## 2018-05-29 DIAGNOSIS — R30.0 DYSURIA: ICD-10-CM

## 2018-05-31 ENCOUNTER — COMMUNICATION - HEALTHEAST (OUTPATIENT)
Dept: FAMILY MEDICINE | Facility: CLINIC | Age: 33
End: 2018-05-31

## 2018-05-31 ENCOUNTER — AMBULATORY - HEALTHEAST (OUTPATIENT)
Dept: LAB | Facility: CLINIC | Age: 33
End: 2018-05-31

## 2018-05-31 DIAGNOSIS — F31.9 BIPOLAR AFFECTIVE DISORDER (H): ICD-10-CM

## 2018-05-31 DIAGNOSIS — R30.0 DYSURIA: ICD-10-CM

## 2018-05-31 DIAGNOSIS — N39.0 UTI (URINARY TRACT INFECTION): ICD-10-CM

## 2018-05-31 LAB
ALBUMIN UR-MCNC: ABNORMAL MG/DL
ANION GAP SERPL CALCULATED.3IONS-SCNC: 15 MMOL/L (ref 5–18)
APPEARANCE UR: ABNORMAL
BACTERIA #/AREA URNS HPF: ABNORMAL HPF
BILIRUB UR QL STRIP: NEGATIVE
BUN SERPL-MCNC: 10 MG/DL (ref 8–22)
CALCIUM SERPL-MCNC: 9.8 MG/DL (ref 8.5–10.5)
CHLORIDE BLD-SCNC: 102 MMOL/L (ref 98–107)
CO2 SERPL-SCNC: 22 MMOL/L (ref 22–31)
COLOR UR AUTO: YELLOW
CREAT SERPL-MCNC: 0.74 MG/DL (ref 0.6–1.1)
GFR SERPL CREATININE-BSD FRML MDRD: >60 ML/MIN/1.73M2
GLUCOSE BLD-MCNC: 114 MG/DL (ref 70–125)
GLUCOSE UR STRIP-MCNC: NEGATIVE MG/DL
HGB UR QL STRIP: ABNORMAL
KETONES UR STRIP-MCNC: NEGATIVE MG/DL
LEUKOCYTE ESTERASE UR QL STRIP: ABNORMAL
LITHIUM SERPL-SCNC: 0.6 MMOL/L (ref 0.6–1.2)
NITRATE UR QL: NEGATIVE
PH UR STRIP: 7 [PH] (ref 5–8)
POTASSIUM BLD-SCNC: 4 MMOL/L (ref 3.5–5)
RBC #/AREA URNS AUTO: ABNORMAL HPF
SODIUM SERPL-SCNC: 139 MMOL/L (ref 136–145)
SP GR UR STRIP: 1.01 (ref 1–1.03)
SQUAMOUS #/AREA URNS AUTO: ABNORMAL LPF
TRANS CELLS #/AREA URNS HPF: ABNORMAL LPF
TSH SERPL DL<=0.005 MIU/L-ACNC: 0.52 UIU/ML (ref 0.3–5)
UROBILINOGEN UR STRIP-ACNC: ABNORMAL
WBC #/AREA URNS AUTO: ABNORMAL HPF

## 2018-06-03 LAB
BACTERIA SPEC CULT: ABNORMAL
BACTERIA SPEC CULT: ABNORMAL

## 2018-07-09 ENCOUNTER — AMBULATORY - HEALTHEAST (OUTPATIENT)
Dept: ADDICTION MEDICINE | Facility: CLINIC | Age: 33
End: 2018-07-09

## 2018-07-09 ENCOUNTER — OFFICE VISIT - HEALTHEAST (OUTPATIENT)
Dept: ADDICTION MEDICINE | Facility: CLINIC | Age: 33
End: 2018-07-09

## 2018-07-09 DIAGNOSIS — F16.20: ICD-10-CM

## 2018-07-09 DIAGNOSIS — F10.21 ALCOHOL USE DISORDER, SEVERE, IN SUSTAINED REMISSION (H): ICD-10-CM

## 2018-07-09 DIAGNOSIS — F15.20 AMPHETAMINE USE DISORDER, SEVERE (H): ICD-10-CM

## 2018-07-09 DIAGNOSIS — F12.20 CANNABIS USE DISORDER, SEVERE, DEPENDENCE (H): ICD-10-CM

## 2018-07-09 DIAGNOSIS — F13.20 SEDATIVE, HYPNOTIC OR ANXIOLYTIC USE DISORDER, SEVERE, DEPENDENCE (H): ICD-10-CM

## 2018-07-09 DIAGNOSIS — F14.20 COCAINE USE DISORDER, SEVERE, DEPENDENCE (H): ICD-10-CM

## 2018-10-30 ENCOUNTER — AMBULATORY - HEALTHEAST (OUTPATIENT)
Dept: FAMILY MEDICINE | Facility: CLINIC | Age: 33
End: 2018-10-30

## 2018-10-30 DIAGNOSIS — R30.0 DYSURIA: ICD-10-CM

## 2018-10-31 ENCOUNTER — OFFICE VISIT - HEALTHEAST (OUTPATIENT)
Dept: FAMILY MEDICINE | Facility: CLINIC | Age: 33
End: 2018-10-31

## 2018-10-31 DIAGNOSIS — N39.0 RECURRENT UTI: ICD-10-CM

## 2018-10-31 LAB
ALBUMIN UR-MCNC: ABNORMAL MG/DL
APPEARANCE UR: CLEAR
BACTERIA #/AREA URNS HPF: ABNORMAL HPF
BILIRUB UR QL STRIP: NEGATIVE
COLOR UR AUTO: YELLOW
GLUCOSE UR STRIP-MCNC: NEGATIVE MG/DL
HGB UR QL STRIP: ABNORMAL
KETONES UR STRIP-MCNC: NEGATIVE MG/DL
LEUKOCYTE ESTERASE UR QL STRIP: ABNORMAL
NITRATE UR QL: NEGATIVE
PH UR STRIP: 6.5 [PH] (ref 5–8)
RBC #/AREA URNS AUTO: ABNORMAL HPF
SP GR UR STRIP: 1.02 (ref 1–1.03)
SQUAMOUS #/AREA URNS AUTO: ABNORMAL LPF
UROBILINOGEN UR STRIP-ACNC: ABNORMAL
WBC #/AREA URNS AUTO: ABNORMAL HPF

## 2018-11-02 LAB — BACTERIA SPEC CULT: ABNORMAL

## 2019-04-18 ENCOUNTER — OFFICE VISIT (OUTPATIENT)
Dept: URGENT CARE | Facility: URGENT CARE | Age: 34
End: 2019-04-18
Payer: COMMERCIAL

## 2019-04-18 VITALS
SYSTOLIC BLOOD PRESSURE: 121 MMHG | TEMPERATURE: 98.4 F | DIASTOLIC BLOOD PRESSURE: 78 MMHG | BODY MASS INDEX: 23.34 KG/M2 | WEIGHT: 136 LBS | OXYGEN SATURATION: 98 % | HEART RATE: 75 BPM

## 2019-04-18 DIAGNOSIS — L30.9 DERMATITIS: Primary | ICD-10-CM

## 2019-04-18 PROCEDURE — 99213 OFFICE O/P EST LOW 20 MIN: CPT | Performed by: PHYSICIAN ASSISTANT

## 2019-04-18 RX ORDER — CETIRIZINE HYDROCHLORIDE 10 MG/1
10 TABLET ORAL DAILY
Qty: 30 TABLET | Refills: 1 | Status: SHIPPED | OUTPATIENT
Start: 2019-04-18 | End: 2022-02-24

## 2019-04-18 RX ORDER — TRIAMCINOLONE ACETONIDE 1 MG/G
CREAM TOPICAL 3 TIMES DAILY
Qty: 80 G | Refills: 1 | Status: SHIPPED | OUTPATIENT
Start: 2019-04-18 | End: 2022-02-24

## 2019-04-18 NOTE — PROGRESS NOTES
SUBJECTIVE:  Amol Franz is a 33 year old female who presents to the clinic today for a rash.  Onset of rash was 2 day(s) ago.   Rash is gradual onset.  Location of the rash: scalp, elbows, behind arms, feet.  Quality/symptoms of rash: itching   Symptoms are mild and rash seems to be worsening.  Previous history of a similar rash? No  Recent exposure history: none known    Associated symptoms include: none history of OCD. 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 Water intake is minimal. Moisturizer use.    Past Medical History:   Diagnosis Date     Bipolar 1 disorder (H)      Current Outpatient Medications   Medication Sig Dispense Refill     QUEtiapine Fumarate (SEROQUEL PO)        Varenicline Tartrate (CHANTIX PO)        FLUoxetine HCl (PROZAC PO) Take by mouth daily       lithium 300 MG capsule Pt takes 300mg in am and 600mg in evening (Patient not taking: Reported on 7/12/2017) 90 capsule 0     OXcarbazepine (TRILEPTAL PO)        RISPERIDONE PO Take by mouth daily       Social History     Tobacco Use     Smoking status: Former Smoker     Smokeless tobacco: Current User     Tobacco comment: Reports smoking e-cigarettes daily.   Substance Use Topics     Alcohol use: Yes     Alcohol/week: 0.0 oz     Comment: has been drinking 1btl plus of wine per day       ROS:  CONSTITUTIONAL:NEGATIVE for fever, chills, change in weight  INTEGUMENTARY/SKIN: rash left shoulder    EXAM:   /78   Pulse 75   Temp 98.4  F (36.9  C) (Tympanic)   Wt 61.7 kg (136 lb)   SpO2 98%   BMI 23.34 kg/m    GENERAL: alert, no acute distress.  SKIN: Rash description:    Distribution: localized  Location: axilla left posterior and left olecranon process   Color: red,  Lesion type: papular, confluent, scattered discrete lesions with excoriation  GENERAL APPEARANCE: healthy, alert and no distress    ASSESSMENT:      1. Dermatitis    - triamcinolone (KENALOG) 0.1 %  external cream; Apply topically 3 times daily  Dispense: 80 g; Refill: 1  - cetirizine (ZYRTEC) 10 MG tablet; Take 1 tablet (10 mg) by mouth daily  Dispense: 30 tablet; Refill: 1  - Skin Protectants, Misc. (EUCERIN) cream; Apply topically as needed for dry skin  Dispense: 454 g; Refill: 1    PLAN:  1) See today's orders. Increase water intake.   2) Follow-up with primary clinic if not improving over the next week.

## 2019-04-28 ENCOUNTER — OFFICE VISIT (OUTPATIENT)
Dept: URGENT CARE | Facility: URGENT CARE | Age: 34
End: 2019-04-28
Payer: COMMERCIAL

## 2019-04-28 VITALS
HEART RATE: 102 BPM | OXYGEN SATURATION: 98 % | DIASTOLIC BLOOD PRESSURE: 79 MMHG | TEMPERATURE: 98.1 F | SYSTOLIC BLOOD PRESSURE: 104 MMHG | WEIGHT: 136 LBS | BODY MASS INDEX: 23.34 KG/M2

## 2019-04-28 DIAGNOSIS — R07.0 THROAT PAIN: ICD-10-CM

## 2019-04-28 DIAGNOSIS — J20.8 ACUTE VIRAL BRONCHITIS: Primary | ICD-10-CM

## 2019-04-28 LAB
DEPRECATED S PYO AG THROAT QL EIA: NORMAL
SPECIMEN SOURCE: NORMAL

## 2019-04-28 PROCEDURE — 87081 CULTURE SCREEN ONLY: CPT | Performed by: INTERNAL MEDICINE

## 2019-04-28 PROCEDURE — 99213 OFFICE O/P EST LOW 20 MIN: CPT | Performed by: INTERNAL MEDICINE

## 2019-04-28 PROCEDURE — 87880 STREP A ASSAY W/OPTIC: CPT | Performed by: INTERNAL MEDICINE

## 2019-04-28 RX ORDER — ALBUTEROL SULFATE 90 UG/1
2 AEROSOL, METERED RESPIRATORY (INHALATION) 4 TIMES DAILY
Qty: 8.5 G | Refills: 0 | Status: SHIPPED | OUTPATIENT
Start: 2019-04-28 | End: 2022-02-24

## 2019-04-28 ASSESSMENT — ENCOUNTER SYMPTOMS
RHINORRHEA: 1
MYALGIAS: 1
HEADACHES: 0
COUGH: 1
ADENOPATHY: 1
FEVER: 1
SORE THROAT: 1
TROUBLE SWALLOWING: 1
FATIGUE: 1

## 2019-04-28 NOTE — LETTER
[facility]  [address] [facility] [address]  Phone: [phone]    April 28, 2019        Amol Franz  [address] [place] [address]          To whom it may concern:    RE: Amol Franz    Patient was seen and treated today at our clinic.  Please excuse up to 3 days off of work for illness.    Please contact me for questions or concerns.      Sincerely,        Chantal Boothe MD

## 2019-04-28 NOTE — PROGRESS NOTES
SUBJECTIVE:   Amol Franz is a 33 year old female presenting with a chief complaint of   Chief Complaint   Patient presents with     Urgent Care     Pharyngitis     c/o sore throat for 4 days       She is an established patient of [facility].    URI Adult    Onset of symptoms was 4 day(s) ago.  Course of illness is worsening.    Current and Associated symptoms: sore throat  Treatment measures tried include Tylenol/Ibuprofen.  Predisposing factors include ill contact:  and exposure to strep.  Initially she thought her allergies were bothering her.  Sons had sore throat 2 weeks ago      Review of Systems   Constitutional: Positive for fatigue and fever.   HENT: Positive for rhinorrhea, sneezing, sore throat and trouble swallowing.    Respiratory: Positive for cough.    Musculoskeletal: Positive for myalgias.   Neurological: Negative for headaches.   Hematological: Positive for adenopathy.       Past Medical History:   Diagnosis Date     Bipolar 1 disorder (H)      No family history on file.  Current Outpatient Medications   Medication Sig Dispense Refill     albuterol (PROAIR HFA/PROVENTIL HFA/VENTOLIN HFA) 108 (90 Base) MCG/ACT inhaler Inhale 2 puffs into the lungs 4 times daily for 7 days 8.5 g 0     cetirizine (ZYRTEC) 10 MG tablet Take 1 tablet (10 mg) by mouth daily 30 tablet 1     FLUoxetine HCl (PROZAC PO) Take by mouth daily       lithium 300 MG capsule Pt takes 300mg in am and 600mg in evening 90 capsule 0     OXcarbazepine (TRILEPTAL PO)        QUEtiapine Fumarate (SEROQUEL PO)        RISPERIDONE PO Take by mouth daily       Skin Protectants, Misc. (EUCERIN) cream Apply topically as needed for dry skin 454 g 1     triamcinolone (KENALOG) 0.1 % external cream Apply topically 3 times daily 80 g 1     Varenicline Tartrate (CHANTIX PO)        Social History     Tobacco Use     Smoking status: Former Smoker     Smokeless tobacco: Current User     Tobacco comment: Reports smoking e-cigarettes  daily.   Substance Use Topics     Alcohol use: Yes     Alcohol/week: 0.0 oz     Comment: has been drinking 1btl plus of wine per day       OBJECTIVE  /79   Pulse 102   Temp 98.1  F (36.7  C) (Tympanic)   Wt 61.7 kg (136 lb)   LMP 04/21/2019   SpO2 98%   BMI 23.34 kg/m      Physical Exam   Constitutional: She appears well-developed and well-nourished.   HENT:   Mouth/Throat: No oropharyngeal exudate.   Tympanic membranes normal  Oropharynx with postnasal drip   Eyes: Conjunctivae are normal.   Cardiovascular: Normal rate, regular rhythm and normal heart sounds.   Pulmonary/Chest: Effort normal.   Scattered wheezes/rhonchi   Lymphadenopathy:     She has cervical adenopathy.   Vitals reviewed.      Labs:  Results for orders placed or performed in visit on 04/28/19 (from the past 24 hour(s))   Strep, Rapid Screen   Result Value Ref Range    Specimen Description Throat     Rapid Strep A Screen       NEGATIVE: No Group A streptococcal antigen detected by immunoassay, await culture report.       X-Ray was not done.    ASSESSMENT:      ICD-10-CM    1. Acute viral bronchitis J20.8 albuterol (PROAIR HFA/PROVENTIL HFA/VENTOLIN HFA) 108 (90 Base) MCG/ACT inhaler   2. Throat pain R07.0 Strep, Rapid Screen     Beta strep group A culture          PLAN:  No ecigs/tobacco      Followup:    If not improving or if condition worsens, follow up with your Primary Care Provider    Patient Instructions     Your symptoms are consistent with viral bronchitis.    I would like you to use an albuterol inhaler 2 puffs 3-4 times a day.  Rest.  Fluids.  Honey.  Nasal saline gargling.  Ibuprofen.      Component      Latest Ref Rng & Units 4/28/2019   Specimen Description       Throat   Rapid Strep A Screen       NEGATIVE: No Group A streptococcal antigen detected by immunoassay, await culture report.     Throat culture will be done.      Patient Education     Viral Bronchitis (Adult)    You have a viral bronchitis. Bronchitis is  inflammation and swelling of the lining of the lungs. This is often caused by an infection. Symptoms include a dry, hacking cough that is worse at night. The cough may bring up yellow-green mucus. You may also feel short of breath or wheeze. Other symptoms may include tiredness, chest discomfort, and chills.  Bronchitis that is caused by a virus is not treated with antibiotics. Instead, medicines may be given to help relieve symptoms. Symptoms can last up to 2 weeks, although the cough may last much longer.  This illness is contagious during the first few days and is spread through the air by coughing and sneezing, or by direct contact (touching the sick person and then touching your own eyes, nose, or mouth).  Most viral illnesses resolve within 10 to 14 days with rest and simple home remedies, although they may sometimes last for several weeks.  Home care    If symptoms are severe, rest at home for the first 2 to 3 days. When you go back to your usual activities, don't let yourself get too tired.    Do not smoke. Also avoid being exposed to secondhand smoke.    You may use over-the-counter medicine to control fever or pain, unless another pain medicine was prescribed. If you have chronic liver or kidney disease or have ever had a stomach ulcer or gastrointestinal bleeding, talk with your healthcare provider before using these medicines. Also talk to your provider if you are taking medicine to prevent blood clots. Aspirin should never be given to anyone younger than 18 years of age who is ill with a viral infection or fever. It may cause severe liver or brain damage.    Your appetite may be poor, so a light diet is fine. Avoid dehydration by drinking 6 to 8 glasses of fluids per day (such as water, soft drinks, sports drinks, juices, tea, or soup). Extra fluids will help loosen secretions in the nose and lungs.    Over-the-counter cough, cold, and sore-throat medicines will not shorten the length of the illness,  but they may help to reduce symptoms. Don't use decongestants if you have high blood pressure.  Follow-up care  Follow up with your healthcare provider, or as advised. If you had an X-ray or ECG (electrocardiogram), a specialist will review it. You will be notified of any new findings that may affect your care.  If you are age 65 or older, or if you have a chronic lung disease or condition that affects your immune system, or you smoke, ask your healthcare provider about getting a pneumococcal vaccine and a yearly flu shot (influenza vaccine).  When to seek medical advice  Call your healthcare provider right away if any of these occur:    Fever of 100.4 F (38 C) or higher, or as directed by your healthcare provider    Coughing up increased amounts of colored sputum    Weakness, drowsiness, headache, facial pain, ear pain, or a stiff neck  Call 911  Call 911 if any of these occur:    Coughing up blood    Worsening weakness, drowsiness, headache, or stiff neck    Trouble breathing, wheezing, or pain with breathing  Date Last Reviewed: 6/1/2018 2000-2018 [employer]. [address] [place] [address]. All rights reserved. This information is not intended as a substitute for professional medical care. Always follow your healthcare professional's instructions.

## 2019-04-28 NOTE — PATIENT INSTRUCTIONS
Your symptoms are consistent with viral bronchitis.    I would like you to use an albuterol inhaler 2 puffs 3-4 times a day.  Rest.  Fluids.  Honey.  Nasal saline gargling.  Ibuprofen.      Component      Latest Ref Rng & Units 4/28/2019   Specimen Description       Throat   Rapid Strep A Screen       NEGATIVE: No Group A streptococcal antigen detected by immunoassay, await culture report.     Throat culture will be done.      Patient Education     Viral Bronchitis (Adult)    You have a viral bronchitis. Bronchitis is inflammation and swelling of the lining of the lungs. This is often caused by an infection. Symptoms include a dry, hacking cough that is worse at night. The cough may bring up yellow-green mucus. You may also feel short of breath or wheeze. Other symptoms may include tiredness, chest discomfort, and chills.  Bronchitis that is caused by a virus is not treated with antibiotics. Instead, medicines may be given to help relieve symptoms. Symptoms can last up to 2 weeks, although the cough may last much longer.  This illness is contagious during the first few days and is spread through the air by coughing and sneezing, or by direct contact (touching the sick person and then touching your own eyes, nose, or mouth).  Most viral illnesses resolve within 10 to 14 days with rest and simple home remedies, although they may sometimes last for several weeks.  Home care    If symptoms are severe, rest at home for the first 2 to 3 days. When you go back to your usual activities, don't let yourself get too tired.    Do not smoke. Also avoid being exposed to secondhand smoke.    You may use over-the-counter medicine to control fever or pain, unless another pain medicine was prescribed. If you have chronic liver or kidney disease or have ever had a stomach ulcer or gastrointestinal bleeding, talk with your healthcare provider before using these medicines. Also talk to your provider if you are taking medicine to prevent  blood clots. Aspirin should never be given to anyone younger than 18 years of age who is ill with a viral infection or fever. It may cause severe liver or brain damage.    Your appetite may be poor, so a light diet is fine. Avoid dehydration by drinking 6 to 8 glasses of fluids per day (such as water, soft drinks, sports drinks, juices, tea, or soup). Extra fluids will help loosen secretions in the nose and lungs.    Over-the-counter cough, cold, and sore-throat medicines will not shorten the length of the illness, but they may help to reduce symptoms. Don't use decongestants if you have high blood pressure.  Follow-up care  Follow up with your healthcare provider, or as advised. If you had an X-ray or ECG (electrocardiogram), a specialist will review it. You will be notified of any new findings that may affect your care.  If you are age 65 or older, or if you have a chronic lung disease or condition that affects your immune system, or you smoke, ask your healthcare provider about getting a pneumococcal vaccine and a yearly flu shot (influenza vaccine).  When to seek medical advice  Call your healthcare provider right away if any of these occur:    Fever of 100.4 F (38 C) or higher, or as directed by your healthcare provider    Coughing up increased amounts of colored sputum    Weakness, drowsiness, headache, facial pain, ear pain, or a stiff neck  Call 911  Call 911 if any of these occur:    Coughing up blood    Worsening weakness, drowsiness, headache, or stiff neck    Trouble breathing, wheezing, or pain with breathing  Date Last Reviewed: 6/1/2018 2000-2018 [employer]. [address] [place] [address], [place] [address]. All rights reserved. This information is not intended as a substitute for professional medical care. Always follow your healthcare professional's instructions.

## 2019-04-29 LAB
BACTERIA SPEC CULT: NORMAL
SPECIMEN SOURCE: NORMAL

## 2019-05-01 ENCOUNTER — NURSE TRIAGE (OUTPATIENT)
Dept: NURSING | Facility: CLINIC | Age: 34
End: 2019-05-01

## 2019-05-01 ENCOUNTER — OFFICE VISIT (OUTPATIENT)
Dept: URGENT CARE | Facility: URGENT CARE | Age: 34
End: 2019-05-01
Payer: COMMERCIAL

## 2019-05-01 VITALS
HEART RATE: 90 BPM | OXYGEN SATURATION: 96 % | HEIGHT: 64 IN | DIASTOLIC BLOOD PRESSURE: 64 MMHG | WEIGHT: 130 LBS | TEMPERATURE: 98.9 F | RESPIRATION RATE: 14 BRPM | SYSTOLIC BLOOD PRESSURE: 100 MMHG | BODY MASS INDEX: 22.2 KG/M2

## 2019-05-01 DIAGNOSIS — H66.001 RIGHT ACUTE SUPPURATIVE OTITIS MEDIA: ICD-10-CM

## 2019-05-01 DIAGNOSIS — J11.1 BRONCHITIS WITH INFLUENZA: Primary | ICD-10-CM

## 2019-05-01 PROCEDURE — 99214 OFFICE O/P EST MOD 30 MIN: CPT | Performed by: FAMILY MEDICINE

## 2019-05-01 RX ORDER — DOXYCYCLINE 100 MG/1
100 CAPSULE ORAL 2 TIMES DAILY
Qty: 20 CAPSULE | Refills: 0 | Status: SHIPPED | OUTPATIENT
Start: 2019-05-01 | End: 2019-05-11

## 2019-05-01 ASSESSMENT — MIFFLIN-ST. JEOR: SCORE: 1274.68

## 2019-05-01 NOTE — LETTER
[facility]  [address] [facility] [id number]  [phone]      May 1, 2019    RE:  Amol Franz                                                                                                                                                       [address] [place] [address]            To whom it may concern:    Amol Franz is under my professional care for    Bronchitis with influenza  Right acute suppurative otitis media.    May return to work   with no restrictions when shortness of breath, severe cough, fevers and chills are resolved.  Influenza is usually infectious for 7-10 days.        Sincerely,        Tabby Locke MD    [facility]

## 2019-05-02 PROBLEM — F10.20 ALCOHOL USE DISORDER, SEVERE, DEPENDENCE (H): Status: ACTIVE | Noted: 2018-07-03

## 2019-05-02 PROBLEM — F50.9 EATING DISORDER: Status: ACTIVE | Noted: 2018-03-16

## 2019-05-02 PROBLEM — T56.892A: Status: ACTIVE | Noted: 2017-01-14

## 2019-05-02 PROBLEM — F12.20 CANNABIS DEPENDENCE (H): Status: ACTIVE | Noted: 2018-07-03

## 2019-05-02 PROBLEM — F31.9 BIPOLAR 1 DISORDER, DEPRESSED (H): Status: ACTIVE | Noted: 2018-04-17

## 2019-05-02 PROBLEM — F19.94 DRUG-INDUCED MOOD DISORDER (H): Status: ACTIVE | Noted: 2018-07-03

## 2019-05-02 PROBLEM — F31.13 BIPOLAR DISORDER WITH SEVERE MANIA (H): Status: ACTIVE | Noted: 2019-05-02

## 2019-05-02 PROBLEM — F11.20 OPIOID USE DISORDER, SEVERE, DEPENDENCE (H): Status: ACTIVE | Noted: 2019-05-02

## 2019-05-02 PROBLEM — F43.10 PTSD (POST-TRAUMATIC STRESS DISORDER): Status: ACTIVE | Noted: 2018-04-17

## 2019-05-02 PROBLEM — F41.1 ANXIETY STATE: Status: ACTIVE | Noted: 2018-07-03

## 2019-05-02 NOTE — PROGRESS NOTES
SUBJECTIVE:   Chief Complaint   Patient presents with     Urgent Care     URI     diagnosed with viral bronchitis, sick x 8 days.      Ear Problem     right side ear pain. can barely hear out of it.      Amol Franz is a 33 year old female who present complaining of flu-like symptoms: fevers, chills, myalgias, headache,  congestion, sore throat and cough for 8 days.   Reports some  dyspnea / little wheezing.  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  She was evaluated in clinic 4 days ago and diagnosed with viral URI- given Rx for albuterol inhaler-  Strep was negative.    She still feels ill and does not feel she can handle return to work-  Needs note    Has pain right ear    Patient Active Problem List   Diagnosis     Alcohol use disorder, severe, dependence (H)     Anxiety state     Bipolar 1 disorder, depressed (H)     Bipolar 1 disorder, mixed, severe (H)     Bipolar disorder with severe cb (H)     Cannabis dependence (H)     Drug-induced mood disorder (H)     Eating disorder     Intentional lithium overdose (H)     Opioid use disorder, severe, dependence (H)     Other known or suspected fetal abnormality, not elsewhere classified, affecting management of mother, antepartum condition or complication     PTSD (post-traumatic stress disorder)     Supervision of high-risk pregnancy       ALLERGIES:     Allergies   Allergen Reactions     Bactrim [Sulfamethoxazole W/Trimethoprim] Other (See Comments)     pancreatitis     Mirtazapine      Weight gain, polyphagia, not feeling well in general     Nuts Unknown         Current Outpatient Medications on File Prior to Visit:  albuterol (PROAIR HFA/PROVENTIL HFA/VENTOLIN HFA) 108 (90 Base) MCG/ACT inhaler Inhale 2 puffs into the lungs 4 times daily for 7 days   QUEtiapine Fumarate (SEROQUEL PO)    cetirizine (ZYRTEC) 10 MG tablet Take 1 tablet (10 mg) by mouth daily (Patient not taking: Reported on 5/1/2019)   FLUoxetine HCl  "(PROZAC PO) Take by mouth daily   lithium 300 MG capsule Pt takes 300mg in am and 600mg in evening (Patient not taking: Reported on 5/1/2019)   OXcarbazepine (TRILEPTAL PO)    RISPERIDONE PO Take by mouth daily   Skin Protectants, Misc. (EUCERIN) cream Apply topically as needed for dry skin   triamcinolone (KENALOG) 0.1 % external cream Apply topically 3 times daily   Varenicline Tartrate (CHANTIX PO)      No current facility-administered medications on file prior to visit.     Social History     Tobacco Use     Smoking status: Former Smoker     Smokeless tobacco: Current User     Tobacco comment: Reports smoking e-cigarettes daily.   Substance Use Topics     Alcohol use: Yes     Alcohol/week: 0.0 oz     Comment: has been drinking 1btl plus of wine per day     Family History:  Non-contributory,  No associated family health conditions    ROS:  CONSTITUTIONAL:N  fever, chills, c   INTEGUMENTARY/SKIN: NEGATIVE for worrisome rashes,    EYES: NEGATIVE for vision changes or irritation  GI: NEGATIVE for nausea, abdominal pain      OBJECTIVE  :/64   Pulse 90   Temp 98.9  F (37.2  C) (Oral)   Resp 14   Ht 1.626 m (5' 4\")   Wt 59 kg (130 lb)   LMP 04/21/2019   SpO2 96%   Breastfeeding? No   BMI 22.31 kg/m    GENERAL APPEARANCE: alert, moderate distress, flushed and fatigued,  Frequent congested cough  EYES: EOMI,  PERRL, conjunctiva clear  HENT:  ear canals and Left TM  normal.  Right TM with erythema, bulging, purulence behind the TM   Nose and mouth without ulcers, erythema or lesions  NECK: supple, nontender, no lymphadenopathy  RESP: rhonchi little bilateral and no rales or wheezes  CV: regular rates and rhythm, normal S1 S2, no murmur noted  NEURO: Normal strength and tone, sensory exam grossly normal,  normal speech and mentation  SKIN: no suspicious lesions or rashes          ASSESSMENT:  Bronchitis with influenza     - doxycycline hyclate (VIBRAMYCIN) 100 MG capsule; Take 1 capsule (100 mg) by mouth 2 " times daily for 10 days    We discussed the limitations of influenza testing and limitations of  antiviral therapy against influenza, that treatment should usually be initiated within 2 days of the start of symptoms and is most critical for persons with weak immunity and chronic respiratory illnesses-  No tamiflu at this stage     Symptomatic therapy suggested:  Rest, drink lots of fluids,  Acetaminophen / ibuprofen for body aches and fever,  Salt water gargles for sore throat,  OTC anesthetic lozenges for sore throat,  OTC cough medications.   Call or return to clinic prn if these symptoms worsen or fail to improve as anticipated.    Treatment and prophylaxis for close contacts  was discussed  Advised that influenza illness usually lasts a week, sometimes more and that the patient should avoid contact with others, and cover the mouth when coughing to limit spread of the infection.    Discussed that influenza and similar illnesses are  potent viruses  that can cause damage to airways making increased risk for developing bronchitis or pneumonia.  In severe cases of chest symptoms an  antibiotic can treat the bacterial superinfection, but I explained that the antibiotic is not effective against the influenza or other respiratory viruses.    Note for work     Right acute suppurative otitis media     - doxycycline hyclate (VIBRAMYCIN) 100 MG capsule; Take 1 capsule (100 mg) by mouth 2 times daily for 10 days     Symptomatic treatment with acetaminophen or Ibuprofen as needed for pain

## 2019-06-05 ENCOUNTER — OFFICE VISIT (OUTPATIENT)
Dept: URGENT CARE | Facility: URGENT CARE | Age: 34
End: 2019-06-05
Payer: COMMERCIAL

## 2019-06-05 VITALS
TEMPERATURE: 99.1 F | OXYGEN SATURATION: 98 % | BODY MASS INDEX: 22.49 KG/M2 | SYSTOLIC BLOOD PRESSURE: 108 MMHG | WEIGHT: 131 LBS | DIASTOLIC BLOOD PRESSURE: 64 MMHG | HEART RATE: 87 BPM

## 2019-06-05 DIAGNOSIS — L30.9 DERMATITIS: Primary | ICD-10-CM

## 2019-06-05 PROCEDURE — 99213 OFFICE O/P EST LOW 20 MIN: CPT | Performed by: INTERNAL MEDICINE

## 2019-06-05 RX ORDER — TRIAMCINOLONE ACETONIDE 1 MG/G
CREAM TOPICAL 2 TIMES DAILY
Qty: 45 G | Refills: 0 | Status: SHIPPED | OUTPATIENT
Start: 2019-06-05 | End: 2022-02-24

## 2019-06-05 RX ORDER — PREDNISONE 20 MG/1
40 TABLET ORAL DAILY
Qty: 10 TABLET | Refills: 0 | Status: SHIPPED | OUTPATIENT
Start: 2019-06-05 | End: 2019-06-10

## 2019-06-05 NOTE — PATIENT INSTRUCTIONS
Prednisone 40 mg 5 day burst  Topical steroid cream 2 x day    Cool compress, Zyrtec morning, benadryl night for itch      See Dermatology   Schedule next available appointment   May cancel if improved

## 2019-06-05 NOTE — PROGRESS NOTES
SUBJECTIVE:   Amol Franz is a 33 year old female presenting with a chief complaint of   Chief Complaint   Patient presents with     Urgent Care     Pt in clinic c/o rash under breasts.     Derm Problem       She is an established patient of [facility].    4/18 - dx dermatitis - this was on her elbows and armpits.  She states it still itches in these areas    4/28 - viral bronchitis  Seen 5/1 for bronchits/otitis media   treatment for doxycline completed    On antibiotics currently for UTI that she started yesterday    Now with rash with under both breast -started few weeks ago  Red very itchy.   ? shingles  treatment -avoiding lotions    Has underwire bra.      Itchy around elbows.  Skin colored bumps on elbow    Denies excessive sweating   does not exercise    Review of Systems    Past Medical History:   Diagnosis Date     Bipolar 1 disorder (H)      No family history on file.  Current Outpatient Medications   Medication Sig Dispense Refill     levonorgestrel (MIRENA) 20 MCG/24HR IUD 1 each by Intrauterine route once       NITROFURANTOIN PO        predniSONE (DELTASONE) 20 MG tablet Take 2 tablets (40 mg) by mouth daily for 5 days 10 tablet 0     QUEtiapine Fumarate (SEROQUEL PO)        triamcinolone (KENALOG) 0.1 % external cream Apply topically 2 times daily 45 g 0     albuterol (PROAIR HFA/PROVENTIL HFA/VENTOLIN HFA) 108 (90 Base) MCG/ACT inhaler Inhale 2 puffs into the lungs 4 times daily for 7 days 8.5 g 0     cetirizine (ZYRTEC) 10 MG tablet Take 1 tablet (10 mg) by mouth daily (Patient not taking: Reported on 5/1/2019) 30 tablet 1     FLUoxetine HCl (PROZAC PO) Take by mouth daily       lithium 300 MG capsule Pt takes 300mg in am and 600mg in evening (Patient not taking: Reported on 5/1/2019) 90 capsule 0     OXcarbazepine (TRILEPTAL PO)        RISPERIDONE PO Take by mouth daily       Skin Protectants, Misc. (EUCERIN) cream Apply topically as needed for dry skin (Patient not taking: Reported on 6/5/2019)  454 g 1     triamcinolone (KENALOG) 0.1 % external cream Apply topically 3 times daily (Patient not taking: Reported on 6/5/2019) 80 g 1     Varenicline Tartrate (CHANTIX PO)        Social History     Tobacco Use     Smoking status: Current Every Day Smoker     Smokeless tobacco: Current User     Tobacco comment: Reports smoking e-cigarettes daily.   Substance Use Topics     Alcohol use: Yes     Alcohol/week: 0.0 oz     Comment: has been drinking 1btl plus of wine per day       OBJECTIVE  /64   Pulse 87   Temp 99.1  F (37.3  C) (Oral)   Wt 59.4 kg (131 lb)   SpO2 98%   BMI 22.49 kg/m      Physical Exam   Constitutional: She appears well-developed and well-nourished.   Skin:   Skin colored bumps bilateral near elbows.  No rash near axilla noted     Patient has bilateral symmetrical rash on chest to that includes sternal area and inner thirds of skin under breast.  Also include slight area of the breast itself    Skin feels dry rough red and thickened   Vitals reviewed.      Labs:  No results found for this or any previous visit (from the past 24 hour(s)).        ASSESSMENT:      ICD-10-CM    1. Dermatitis L30.9 predniSONE (DELTASONE) 20 MG tablet     triamcinolone (KENALOG) 0.1 % external cream        Medical Decision Making:    Differential Diagnosis:  Discussed with patient rash could be related to dermatitis reaction to underwire bra, dry skin.  Also discussed could potentially be fungus/yeast infection  Discussed with patient that oral steroid and topical steroid cream can make a yeast/fungal infection worse.  Discussed treatments are different      Patient Instructions   Prednisone 40 mg 5 day burst  Topical steroid cream 2 x day    Cool compress, Zyrtec morning, benadryl night for itch      See Dermatology   Schedule next available appointment   May cancel if improved

## 2019-07-21 ENCOUNTER — OFFICE VISIT (OUTPATIENT)
Dept: URGENT CARE | Facility: URGENT CARE | Age: 34
End: 2019-07-21
Payer: COMMERCIAL

## 2019-07-21 VITALS
DIASTOLIC BLOOD PRESSURE: 62 MMHG | BODY MASS INDEX: 22.2 KG/M2 | RESPIRATION RATE: 12 BRPM | TEMPERATURE: 98.3 F | SYSTOLIC BLOOD PRESSURE: 106 MMHG | HEART RATE: 80 BPM | HEIGHT: 64 IN | WEIGHT: 130 LBS

## 2019-07-21 DIAGNOSIS — S01.81XA CHIN LACERATION, INITIAL ENCOUNTER: Primary | ICD-10-CM

## 2019-07-21 PROCEDURE — 12011 RPR F/E/E/N/L/M 2.5 CM/<: CPT | Performed by: INTERNAL MEDICINE

## 2019-07-21 ASSESSMENT — MIFFLIN-ST. JEOR: SCORE: 1274.68
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
## 2019-07-21 NOTE — PATIENT INSTRUCTIONS
Declines update on tetanus today  Will consider with primary clinic  Watch for signs infection    Call or return to clinic if symptoms worsen or fail to improve as anticipated.      Patient Education     Laceration, Chin, Skin Glue Repair  A laceration is a cut through the skin. If you have a chin laceration, skin glue may be used to repair the cut. Skin glue is usually used on cuts that are shallow, have smooth edges, and are not infected. A lower layer of skin may be closed with sutures before skin glue is applied. Skin glue provides a water-resistant covering that allows for fast healing. No bandages are required and skin glue peels off on its own within 5 to 10 days.  Home care  Medicines:  Acetaminophen or ibuprofen may be taken for pain, unless another pain medicine was prescribed. If you have chronic liver or kidney disease, talk with your doctor before using these medicines. Also talk with your doctor if you have ever had a stomach ulcer or gastrointestinal bleeding.  General care    Keep the wound clean and dry. You may shower or bathe as usual, but do not use soaps, lotions, or ointments on the wound area. Do not scrub the wound. After bathing, pat the wound dry with a soft towel.    Avoid soaking the laceration in water for 7 to 10 days. Use a clean cloth to gently pat the area dry if it gets wet.    Do not scratch, rub, or pick at the skin glue. Do not place tape directly over the skin glue.    Do not apply liquids (such as peroxide), ointments, or creams to the wound while the skin glue is in place.    If the adhesive does not peel off after 10 days, apply petroleum jelly or an antibiotic ointment to the area.    Most chin wounds heal without problems. However, an infection sometimes occurs despite proper treatment. Therefore, watch for the signs of infection listed below.    Certain types of skin glues cannot be used if you have an allergy to latex or formaldehyde. Tell your doctor right away about  your allergies.  Follow-up care  Follow up with your healthcare provider, or as advised. The skin glue will fall off naturally in 5 to 10 days.  When to seek medical advice  Call your healthcare provider right away if any of these occur:    Signs of infection:  ? Fever of 100.4 F (38 C) or higher, or as directed by your health care provider  ? Increasing pain in the wound  ? Increasing redness or swelling  ? Pus coming from the wound    Wound bleeds more than a small amount or bleeding doesn t stop    Wound edges come apart  Date Last Reviewed: 9/1/2016 2000-2018 [employer]. [address] [place], [place] [id number]. All rights reserved. This information is not intended as a substitute for professional medical care. Always follow your healthcare professional's instructions.

## 2019-10-11 ENCOUNTER — ANCILLARY PROCEDURE (OUTPATIENT)
Dept: GENERAL RADIOLOGY | Facility: CLINIC | Age: 34
End: 2019-10-11
Attending: NURSE PRACTITIONER
Payer: COMMERCIAL

## 2019-10-11 ENCOUNTER — OFFICE VISIT (OUTPATIENT)
Dept: URGENT CARE | Facility: URGENT CARE | Age: 34
End: 2019-10-11
Payer: COMMERCIAL

## 2019-10-11 VITALS
BODY MASS INDEX: 22.31 KG/M2 | TEMPERATURE: 98 F | RESPIRATION RATE: 16 BRPM | HEART RATE: 110 BPM | DIASTOLIC BLOOD PRESSURE: 84 MMHG | SYSTOLIC BLOOD PRESSURE: 116 MMHG | HEIGHT: 64 IN

## 2019-10-11 DIAGNOSIS — S69.92XA INJURY OF FINGER OF LEFT HAND, INITIAL ENCOUNTER: Primary | ICD-10-CM

## 2019-10-11 DIAGNOSIS — T74.91XA DOMESTIC VIOLENCE OF ADULT, INITIAL ENCOUNTER: ICD-10-CM

## 2019-10-11 DIAGNOSIS — S00.81XA CHIN ABRASION, NON-INFECTED: ICD-10-CM

## 2019-10-11 DIAGNOSIS — S69.92XA INJURY OF FINGER OF LEFT HAND, INITIAL ENCOUNTER: ICD-10-CM

## 2019-10-11 PROCEDURE — 73140 X-RAY EXAM OF FINGER(S): CPT | Mod: LT

## 2019-10-11 PROCEDURE — 99213 OFFICE O/P EST LOW 20 MIN: CPT | Performed by: NURSE PRACTITIONER
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
## 2019-10-11 NOTE — PATIENT INSTRUCTIONS
Patient Education     Finger Sprain  A sprain is a stretching or tearing of the ligaments that hold a joint together. There are no broken bones. Sprains take 3 to 6 weeks or more to heal.  A sprained finger may be treated with a splint or buddy tape. This is when you tape the injured finger to the one next to it for support. Minor sprains may require no additional support.  Home care    Keep your hand elevated to reduce pain and swelling. This is very important during the first 48 hours.    Apply an ice pack over the injured area for 15 to 20 minutes every 3 to 6 hours. You should do this for the first 24 to 48 hours. You can make an ice pack by filling a plastic bag that seals at the top with ice cubes and then wrapping it with a thin towel. Continue the use of ice packs for relief of pain and swelling as needed. As the ice melts, be careful to avoid getting any wrap or splint wet. After 48 hours, apply heat (warm shower or warm bath) for 15 to 20 minutes several times a day, or alternate ice and heat.    If buddy tape was applied and it becomes wet or dirty, change it. You may replace it with paper, plastic or cloth tape. Cloth tape and paper tapes must be kept dry. Apply gauze or cotton padding between the fingers, especially at the webbed space. This will help prevent the skin from getting moist and breaking down. Keep the buddy tape in place for at least 4 weeks, or as instructed by your healthcare provider.    If a splint was applied, wear it for the time advised.    You may use over-the-counter pain medicine to control pain, unless another pain medicine was prescribed. If you have chronic liver or kidney disease or ever had a stomach ulcer or gastrointestinal bleeding, talk with your healthcare provider before using these medicines.  Follow-up care  Follow up with your healthcare provider, or as directed. Finger joints will become stiff if immobile for too long. If a splint was applied, ask your healthcare  provider when it is safe to begin range-of-motion exercises.  Sometimes fractures don t show up on the first X-ray. Bruises and sprains can sometimes hurt as much as a fracture. These injuries can take time to heal completely. If your symptoms don t improve or they get worse, talk with your healthcare provider. You may need a repeat X-ray. If X-rays were taken, you will be told of any new findings that may affect your care.  When to seek medical advice  Call your healthcare provider right away if any of these occur:    Pain or swelling increases    Fingers or hand becomes cold, blue, numb, or tingly  Date Last Reviewed: 5/1/2018 2000-2018 [employer]. [address] [place] [address], [place] [address]. All rights reserved. This information is not intended as a substitute for professional medical care. Always follow your healthcare professional's instructions.

## 2019-10-21 NOTE — PATIENT INSTRUCTIONS
Patient Education     Influenza (Adult)    Influenza is also called the flu. It is a viral illness that affects the air passages of your lungs. It is different from the common cold. The flu can easily be passed from one to person to another. It may be spread through the air by coughing and sneezing. Or it can be spread by touching the sick person and then touching your own eyes, nose, or mouth.  The flu starts 1 to 3 days after you are exposed to the flu virus. It may last for 1 to 2 weeks but many people feel tired or fatigued for many weeks afterward. You usually don t need to take antibiotics unless you have a complication. This might be an ear or sinus infection or pneumonia.  Symptoms of the flu may be mild or severe. They can include extreme tiredness (wanting to stay in bed all day), chills, fevers, muscle aches, soreness with eye movement, headache, and a dry, hacking cough.  Home care  Follow these guidelines when caring for yourself at home:    Avoid being around cigarette smoke, whether yours or other people s.    Acetaminophen or ibuprofen will help ease your fever, muscle aches, and headache. Don t give aspirin to anyone younger than 18 who has the flu. Aspirin can harm the liver.    Nausea and loss of appetite are common with the flu. Eat light meals. Drink 6 to 8 glasses of liquids every day. Good choices are water, sport drinks, soft drinks without caffeine, juices, tea, and soup. Extra fluids will also help loosen secretions in your nose and lungs.    Over-the-counter cold medicines will not make the flu go away faster. But the medicines may help with coughing, sore throat, and congestion in your nose and sinuses. Don t use a decongestant if you have high blood pressure.    Stay home until your fever has been gone for at least 24 hours without using medicine to reduce fever.  Follow-up care  Follow up with your healthcare provider, or as advised, if you are not getting better over the next  week.  If you are age 65 or older, talk with your provider about getting a pneumococcal vaccine every 5 years. You should also get this vaccine if you have chronic asthma or COPD. All adults should get a flu vaccine every fall. Ask your provider about this.  When to seek medical advice  Call your healthcare provider right away if any of these occur:    Cough with lots of colored mucus (sputum) or blood in your mucus    Chest pain, shortness of breath, wheezing, or trouble breathing    Severe headache, or face, neck, or ear pain    New rash with fever    Fever of 100.4 F (38 C) or higher, or as directed by your healthcare provider    Confusion, behavior change, or seizure    Severe weakness or dizziness    You get a new fever or cough after getting better for a few days  Date Last Reviewed: 1/1/2017 2000-2018 [employer]. [address] [place], [place] [id number]. All rights reserved. This information is not intended as a substitute for professional medical care. Always follow your healthcare professional's instructions.           Patient Education     What Is Acute Bronchitis?  Acute bronchitis is when the airways in your lungs (bronchial tubes) become red and swollen (inflamed). It is usually caused by a viral infection. But it can also occur because of a bacteria or allergen. Symptoms include a cough that produces yellow or greenish mucus and can last for days or sometimes weeks.  Inside healthy lungs    Air travels in and out of the lungs through the airways. The linings of these airways produce sticky mucus. This mucus traps particles that enter the lungs. Tiny structures called cilia then sweep the particles out of the airways.     Healthy airway: Airways are normally open. Air moves in and out easily.      Healthy cilia: Tiny, hairlike cilia sweep mucus and particles up and out of the airways.     Lungs with bronchitis  Bronchitis often occurs with a cold or the flu virus. The airways become  inflamed (red and swollen). There is a deep hacking cough from the extra mucus. Other symptoms may include:    Wheezing    Chest discomfort    Shortness of breath    Mild fever  A second infection, this time due to bacteria, may then occur. And airways irritated by allergens or smoke are more likely to get infected.        Inflamed airway: Inflammation and extra mucus narrow the airway, causing shortness of breath.      Impaired cilia: Extra mucus impairs cilia, causing congestion and wheezing. Smoking makes the problem worse.     Making a diagnosis  A physical exam, health history, and certain tests help your healthcare provider make the diagnosis.  Health history  Your healthcare provider will ask you about your symptoms.  The exam  Your provider listens to your chest for signs of congestion. He or she may also check your ears, nose, and throat.  Possible tests    A sputum test for bacteria. This requires a sample of mucus from your lungs.    A nasal or throat swab. This tests to see if you have a bacterial infection.    A chest X-ray. This is done if your healthcare provider thinks you have pneumonia.    Tests to check for an underlying condition. Other tests may be done to check for things such as allergies, asthma, or COPD (chronic obstructive pulmonary disease). You may need to see a specialist for more lung function testing.  Treating a cough  The main treatment for bronchitis is easing symptoms. Avoiding smoke, allergens, and other things that trigger coughing can often help. If the infection is bacterial, you may be given antibiotics. During the illness, it's important to get plenty of sleep. To ease symptoms:    Don t smoke. Also avoid secondhand smoke.    Use a humidifier. Or try breathing in steam from a hot shower. This may help loosen mucus.    Drink a lot of water and juice. They can soothe the throat and may help thin mucus.    Sit up or use extra pillows when in bed. This helps to lessen coughing  and congestion.    Ask your provider about using medicine. Ask about using cough medicine, pain and fever medicine, or a decongestant.  Antibiotics  Most cases of bronchitis are caused by cold or flu viruses. They don t need antibiotics to treat them, even if your mucus is thick and green or yellow. Antibiotics don t treat viral illness and antibiotics have not been shown to have any benefit in cases of acute bronchitis. Taking antibiotics when they are not needed increases your risk of getting an infection later that is antibiotic-resistant. Antibiotics can also cause severe cases of diarrhea that require other antibiotics to treat.  It is important that you accept your healthcare provider's opinion to not use antibiotics. Your provider will prescribe antibiotics if the infection is caused by bacteria. If they are prescribed:    Take all of the medicine. Take the medicine until it is used up, even if symptoms have improved. If you don t, the bronchitis may come back.    Take the medicines as directed. For instance, some medicines should be taken with food.    Ask about side effects. Ask your provider or pharmacist what side effects are common, and what to do about them.  Follow-up care  You should see your provider again in 2 to 3 weeks. By this time, symptoms should have improved. An infection that lasts longer may mean you have a more serious problem.  Prevention    Avoid tobacco smoke. If you smoke, quit. Stay away from smoky places. Ask friends and family not to smoke around you, or in your home or car.    Get checked for allergies.    Ask your provider about getting a yearly flu shot. Also ask about pneumococcal or pneumonia shots.    Wash your hands often. This helps reduce the chance of picking up viruses that cause colds and flu.  Call your healthcare provider if:    Symptoms worsen, or you have new symptoms    Breathing problems worsen or  become severe    Symptoms don t get better within a week, or within  3 days of taking antibiotics   Date Last Reviewed: 2/1/2017 2000-2018 [employer]. [address] [place] [address], [place] [address]. All rights reserved. This information is not intended as a substitute for professional medical care. Always follow your healthcare professional's instructions.            Quality 131: Pain Assessment And Follow-Up: Pain assessment using a standardized tool is documented as negative, no follow-up plan required Quality 110: Preventive Care And Screening: Influenza Immunization: Influenza Immunization Administered during Influenza season Quality 130: Documentation Of Current Medications In The Medical Record: Current Medications Documented Quality 226: Preventive Care And Screening: Tobacco Use: Screening And Cessation Intervention: Patient screened for tobacco use and is an ex/non-smoker Detail Level: Detailed Quality 111:Pneumonia Vaccination Status For Older Adults: Pneumococcal Vaccination Previously Received

## 2019-11-14 ENCOUNTER — OFFICE VISIT - HEALTHEAST (OUTPATIENT)
Dept: MIDWIFE SERVICES | Facility: CLINIC | Age: 34
End: 2019-11-14

## 2019-11-14 DIAGNOSIS — F11.20 OPIOID DEPENDENCE, UNCOMPLICATED (H): ICD-10-CM

## 2019-11-14 DIAGNOSIS — F17.210 CIGARETTE SMOKER: ICD-10-CM

## 2019-11-14 DIAGNOSIS — F19.10 SUBSTANCE ABUSE (H): ICD-10-CM

## 2019-11-14 DIAGNOSIS — Z72.0 TOBACCO ABUSE: ICD-10-CM

## 2019-11-14 DIAGNOSIS — Z11.3 SCREEN FOR STD (SEXUALLY TRANSMITTED DISEASE): ICD-10-CM

## 2019-11-14 DIAGNOSIS — Z30.432 ENCOUNTER FOR IUD REMOVAL: ICD-10-CM

## 2019-11-14 DIAGNOSIS — N92.0 MENORRHAGIA WITH REGULAR CYCLE: ICD-10-CM

## 2019-11-14 ASSESSMENT — MIFFLIN-ST. JEOR: SCORE: 1269.21

## 2019-11-15 LAB
HSV1 IGG SERPL QL IA: NEGATIVE
HSV2 IGG SERPL QL IA: NEGATIVE

## 2020-03-10 ENCOUNTER — COMMUNICATION - HEALTHEAST (OUTPATIENT)
Dept: FAMILY MEDICINE | Facility: CLINIC | Age: 35
End: 2020-03-10

## 2020-03-10 DIAGNOSIS — N39.0 RECURRENT UTI: ICD-10-CM

## 2020-03-25 ENCOUNTER — COMMUNICATION - HEALTHEAST (OUTPATIENT)
Dept: FAMILY MEDICINE | Facility: CLINIC | Age: 35
End: 2020-03-25

## 2020-03-25 DIAGNOSIS — N39.0 RECURRENT UTI: ICD-10-CM

## 2020-09-04 ENCOUNTER — AMBULATORY - HEALTHEAST (OUTPATIENT)
Dept: FAMILY MEDICINE | Facility: CLINIC | Age: 35
End: 2020-09-04

## 2020-09-04 ENCOUNTER — VIRTUAL VISIT (OUTPATIENT)
Dept: FAMILY MEDICINE | Facility: OTHER | Age: 35
End: 2020-09-04

## 2020-09-04 DIAGNOSIS — Z20.822 SUSPECTED COVID-19 VIRUS INFECTION: ICD-10-CM

## 2020-09-04 NOTE — PROGRESS NOTES
"Date: 2020 09:12:59  Clinician: Kelly Kingsley  Clinician NPI: [id number]  Patient: Amol Corado  Patient : 1985  Patient Address: [address], [place] [address]  Patient Phone: [phone]  Visit Protocol: URI  Patient Summary:  Amol is a 34 year old ( : 1985 ) female who initiated a Visit for cold, sinus infection, or influenza. When asked the question \"Please sign me up to receive news, health information and promotions. \", Amol responded \"No\".    Amol states her symptoms started gradually 3-4 days ago. After her symptoms started, they improved and then got worse again.   Her symptoms consist of facial pain or pressure, rhinitis, nausea, a sore throat, a cough, nasal congestion, chills, malaise, enlarged lymph nodes, and myalgia. She is experiencing mild difficulty breathing with activities but can speak normally in full sentences. Amol also feels feverish but was unable to measure her temperature.   Symptom details     Nasal secretions: The color of her mucus is white and yellow.    Cough: Amol coughs a few times an hour and her cough is more bothersome at night. Phlegm does not come into her throat when she coughs. She does not believe her cough is caused by post-nasal drip.     Sore throat: Amol reports having severe throat pain (7-9 on a 10 point pain scale), does not have exudate on her tonsils, and can swallow liquids. The lymph nodes in her neck are enlarged. A rash has not appeared on the skin since the sore throat started.     Facial pain or pressure: The facial pain or pressure does not feel worse when bending or leaning forward.      Amol denies having ear pain, anosmia, vomiting, wheezing, teeth pain, ageusia, diarrhea, and headache. She also denies taking antibiotic medication in the past month, having recent facial or sinus surgery in the past 60 days, and having a sinus infection within the past year.   Precipitating events  Amol is not sure if she has been " exposed to someone with strep throat. She has not recently been exposed to someone with influenza. Amol has not been in close contact with any high risk individuals.   Pertinent COVID-19 (Coronavirus) information  In the past 14 days, Amol has not worked in a congregate living setting.   She does not work or volunteer as healthcare worker or a  and does not work or volunteer in a healthcare facility.   Amol also has not lived in a congregate living setting in the past 14 days. She does not live with a healthcare worker.   Amol has not had a close contact with a laboratory-confirmed COVID-19 patient within 14 days of symptom onset.   Since December 2019, Amol and has had upper respiratory infection (URI) or influenza-like illness. Has not been diagnosed with lab-confirmed COVID-19 test      Date(s) of previous URI or influenza-like illness (free-text): Feb 2020     Symptoms Amol experienced during previous URI or influenza-like illness as reported by the patient (free-text): Fever, weakness, sore throat        Pertinent medical history  Amol does not get yeast infections when she takes antibiotics.   Amol does not need a return to work/school note.   Weight: 125 lbs   mAol smokes or uses smokeless tobacco.   She denies pregnancy and denies breastfeeding. She has menstruated in the past month.   Additional information as reported by the patient (free text): I am hoping to get pain medication prescribed to help with the severe body aches i am having. My covid test will be taken today at 1 and im hoping to have some kind of medication to help me through this. I am miserable and in so much pain.   Weight: 125 lbs    MEDICATIONS: No current medications, ALLERGIES: NKDA  Clinician Response:  Dear Amol,  Based on the information provided, you have viral sinusitis, also known as a sinus infection. Sinus infections are caused by bacteria or a virus and symptoms are almost always identical. The  difference between the 2 types of infections is timing.  Sinus infections start as viral infections and symptoms improve on their own in about 7 days. If symptoms have not improved after 7 days or have even worsened, a bacterial infection may have developed.  Medication information  Because you have a viral infection, antibiotics will not help you get better. Treating a viral infection with antibiotics could actually make you feel worse.  I am prescribing:       Fluticasone 50 mcg/actuation nasal spray. Inhale 2 sprays in each nostril 1 time per day; after 1 week, may adjust to 1 - 2 sprays in each nostril 1 time per day. This medication takes several days to start working, so keep taking it even if it doesn't help right away. There are no refills with this prescription.      Ibuprofen 800 mg oral tablet. Take 1 tablet by mouth 3 times per day as needed for 7 days. Do not exceed 2400mg in 24 hours. There are no refills with this prescription.      Benzonatate (Tessalon Perles) 100 mg oral capsule. Take 1-2 capsules by mouth 3 times per day as needed for your cough. There are no refills with this prescription.     Unless you are allergic to the over-the-counter medication(s) below, I recommend using:       Acetaminophen (Tylenol or store brand) oral tablet. Take 1-2 tablets by mouth every 4-6 hours to help with the discomfort.    An antihistamine such as Allegra, Claritin, Zyrtec, or store brand. Please follow the instructions on the package.    A decongestant such as Sudafed PE or store brand.     Over-the-counter medications do not require a prescription. Ask the pharmacist if you have any questions.  Self care  Steps you can take to be as comfortable as possible:     Rest.    Drink plenty of fluids.    Take a warm shower to loosen congestion    Use a cool-mist humidifier.    Use throat lozenges.    Suck on frozen items such as popsicles.    Drink hot tea with lemon and honey.    Gargle with warm salt water (1/4  teaspoon of salt per 8 ounce glass of water).    Take a spoonful of honey to reduce your cough.     Also, as your provider, I need you to know that becoming tobacco-free is the most important thing you can do to protect your current and future health.  When to seek care  Please be seen in a clinic or urgent care if any of the following occur:   New symptoms develop, or symptoms become worse   Call ahead before going to the clinic or urgent care.  Call 911 or go to the emergency room if you feel that your throat is closing off, you suddenly develop a rash, you are unable to swallow fluids, you are drooling, or you are having difficulty breathing.  COVID-19 (Coronavirus) General Information  Because there is currently no vaccine to prevent infection, the best way to protect yourself is to avoid being exposed to this virus. Common symptoms of COVID-19 include but are not limited to fever, cough, and shortness of breath. These symptoms appear 2-14 days after you are exposed to the virus that causes COVID-19. Click here for more information from the CDC on how to protect yourself.  If you are sick with COVID-19 or suspect you are infected with the virus that causes COVID-19, follow the steps here from the CDC to help prevent the disease from spreading to people in your home and community.  Click here for general information from the CDC on testing.  If you develop any of these emergency warning signs for COVID-19, get medical attention immediately:     Trouble breathing    Persistent pain or pressure in the chest    New confusion or inability to arouse    Bluish lips or face      Call your doctor or clinic before going in. Call 911 if you have a medical emergency and notify the  you have or think you may have COVID-19.  For more detailed and up to date information on COVID-19 (Coronavirus), please visit the CDC website.   Diagnosis: Viral sinusitis  Diagnosis ICD: J01.90  Prescription: benzonatate (Tessalon  Perles) 100 mg oral capsule 30 capsule, 5 days supply. Take 1-2 capsules by mouth 3 times per day as needed. Refills: 0, Refill as needed: no, Allow substitutions: yes  Prescription: fluticasone 50 mcg/actuation nasal spray,suspension 1 120 spray aerosol with adapter (grams), 30 days supply. Inhale 2 sprays in each nostril 1 time per day; after 1 week, may adjust to 1 - 2 sprays in each nostril 1 time per day.. Refills: 0, Refill as needed: no, Allow substitutions: yes  Prescription: ibuprofen (IBU) 800 mg oral tablet 21 tablet, 7 days supply. Take 1 tablet by mouth 3 times per day as needed for 7 days. Refills: 0, Refill as needed: no, Allow substitutions: yes

## 2020-09-04 NOTE — PROGRESS NOTES
"Date: 2020 08:08:25  Clinician: Kelly Kingsley  Clinician NPI: [id number]  Patient: Amol Corado  Patient : 1985  Patient Address: [address] [place] [address] [address], [place] [address]  Patient Phone: [phone]  Visit Protocol: URI  Patient Summary:  Amol is a 34 year old ( : 1985 ) female who initiated a Visit for COVID-19 (Coronavirus) evaluation and screening. When asked the question \"Please sign me up to receive news, health information and promotions. \", Amol responded \"No\".    Amol states her symptoms started gradually 5-6 days ago. After her symptoms started, they improved and then got worse again.   Her symptoms consist of facial pain or pressure, rhinitis, nausea, a sore throat, a cough, nasal congestion, chills, malaise, enlarged lymph nodes, and myalgia. She is experiencing mild difficulty breathing with activities but can speak normally in full sentences. Amol also feels feverish but was unable to measure her temperature.   Symptom details     Nasal secretions: The color of her mucus is white and yellow.    Cough: Amol coughs a few times an hour and her cough is not more bothersome at night. Phlegm does not come into her throat when she coughs. She does not believe her cough is caused by post-nasal drip.     Sore throat: Amol reports having moderate throat pain (4-6 on a 10 point pain scale), does not have exudate on her tonsils, and can swallow liquids. The lymph nodes in her neck are enlarged. A rash has not appeared on the skin since the sore throat started.     Facial pain or pressure: The facial pain or pressure does not feel worse when bending or leaning forward.      Amol denies having ear pain, anosmia, vomiting, wheezing, teeth pain, ageusia, diarrhea, and headache. She also denies taking antibiotic medication in the past month, having recent facial or sinus surgery in the past 60 days, and having a sinus infection within the past year.   Precipitating events  Amol is not sure " if she has been exposed to someone with strep throat. She has not recently been exposed to someone with influenza. Amol has not been in close contact with any high risk individuals.   Pertinent COVID-19 (Coronavirus) information  In the past 14 days, Amol has not worked in a congregate living setting.   She does not work or volunteer as healthcare worker or a  and does not work or volunteer in a healthcare facility.   Amol also has not lived in a congregate living setting in the past 14 days. She does not live with a healthcare worker.   Amol has not had a close contact with a laboratory-confirmed COVID-19 patient within 14 days of symptom onset.   Since December 2019, Amol and has had upper respiratory infection (URI) or influenza-like illness. Has not been diagnosed with lab-confirmed COVID-19 test      Date(s) of previous URI or influenza-like illness (free-text): Feb 2020     Symptoms Amol experienced during previous URI or influenza-like illness as reported by the patient (free-text): Fever, weakness, sore throat        Pertinent medical history  Amol does not get yeast infections when she takes antibiotics.   Amol does not need a return to work/school note.   Weight: 125 lbs   Amol smokes or uses smokeless tobacco.   She denies pregnancy and denies breastfeeding. She has menstruated in the past month.   Additional information as reported by the patient (free text): I am constantly dizzy lately. Very weak, extremely sore throat, labored and difficult breathing at times.   Weight: 125 lbs    MEDICATIONS: No current medications, ALLERGIES: NKDA  Clinician Response:  Dear mAol,   Your symptoms show that you may have coronavirus (COVID-19). This illness can cause fever, cough and trouble breathing. Many people get a mild case and get better on their own. Some people can get very sick.  Based on the symptoms you have shared, I would like you to be re-checked in 2 to 3 days. Please call your  "family clinic to set up a video or phone visit.  Will I be tested for COVID-19?  We would like to test you for this virus.   Please call [phone] to schedule your visit. Explain that you were referred by [facility] to have a COVID-19 test. Be ready to share your [facility] visit ID number.   The following will serve as your written order for this COVID Test, ordered by me, for the indication of suspected COVID [Z20.828]: The test will be ordered in [employer], our electronic health record, after you are scheduled. It will show as ordered and authorized by Felton Anton MD.  Order: COVID-19 (Coronavirus) PCR for SYMPTOMATIC testing from [facility].  1.When it's time for your COVID test:   Stay at least 6 feet away from others. (If someone will drive you to your test, stay in the backseat, as far away from the  as you can.)   Cover your mouth and nose with a mask, tissue or washcloth.  Go straight to the testing site. Don't make any stops on the way there or back.      2.Starting now: Stay home and away from others (self-isolate) until:   You've had no fever---and no medicine that reduces fever---for one full day (24 hours). And...   Your other symptoms have gotten better. For example, your cough or breathing has improved. And...   At least 10 days have passed since your symptoms started.       During this time, don't leave the house except for testing or medical care.   Stay in your own room, even for meals. Use your own bathroom if you can.   Stay away from others in your home. No hugging, kissing or shaking hands. No visitors.  Don't go to work, school or anywhere else.    Clean \"high touch\" surfaces often (doorknobs, counters, handles, etc.). Use a household cleaning spray or wipes. You'll find a full list of  on the EPA website: www.epa.gov/pesticide-registration/list-n-disinfectants-use-against-sars-cov-2.   Cover your mouth and nose with a mask, tissue or washcloth to avoid spreading germs.  Wash your hands and " face often. Use soap and water.  Caregivers in these groups are at risk for severe illness due to COVID-19:  o People 65 years and older  o People who live in a nursing home or long-term care facility  o People with chronic disease (lung, heart, cancer, diabetes, kidney, liver, immunologic)   o People who have a weakened immune system, including those who:   Are in cancer treatment  Take medicine that weakens the immune system, such as corticosteroids  Had a bone marrow or organ transplant  Have an immune deficiency  Have poorly controlled HIV or AIDS  Are obese (body mass index of 40 or higher)  Smoke regularly   o Caregivers should wear gloves while washing dishes, handling laundry and cleaning bedrooms and bathrooms.  o Use caution when washing and drying laundry: Don't shake dirty laundry, and use the warmest water setting that you can.  o For more tips, go to www.cdc.gov/coronavirus/2019-ncov/downloads/10Things.pdf.      How can I take care of myself?   Get lots of rest. Drink extra fluids (unless a doctor has told you not to)   Take Tylenol (acetaminophen) for fever or pain. If you have liver or kidney problems, ask your family doctor if it's okay to take Tylenol.   Adults can take either:    650 mg (two 325 mg pills) every 4 to 6 hours, or...   1,000 mg (two 500 mg pills) every 8 hours as needed.    Note: Don't take more than 3,000 mg in one day. Acetaminophen is found in many medicines (both prescribed and over-the-counter medicines). Read all labels to be sure you don't take too much.   For children, check the Tylenol bottle for the right dose. The dose is based on the child's age or weight.    If you have other health problems (like cancer, heart failure, an organ transplant or severe kidney disease): Call your specialty clinic if you don't feel better in the next 2 days.       Know when to call 911. Emergency warning signs include:    Trouble breathing or shortness of breath Pain or pressure in the chest  that doesn't go away Feeling confused like you haven't felt before, or not being able to wake up Bluish-colored lips or face  Where can I get more information?   [facility] -- About COVID-19: [email or web address]   CDC -- What to Do If You're Sick: www.cdc.gov/coronavirus/2019-ncov/about/steps-when-sick.html   CDC -- Ending Home Isolation: www.cdc.gov/coronavirus/2019-ncov/hcp/disposition-in-home-patients.html   [facility] -- Caring for Someone: www.cdc.gov/coronavirus/2019-ncov/if-you-are-sick/care-for-someone.html   [facility] -- Interim Guidance for Hospital Discharge to Home: www.[facility].[place]/diseases/coronavirus/hcp/hospdischarge.pdf   [facility] clinical trials (COVID-19 research studies): [email or web address]-clinical-trials    Below are the COVID-19 hotlines at the [facility] ([facility]). Interpreters are available.    For health questions: Call 041-016-2813 or 1-661.157.8223 (7 a.m. to 7 p.m.) For questions about schools and childcare: Call 909-980-4254 or 1-805.691.6438 (7 a.m. to 7 p.m.)       Diagnosis: Cough  Diagnosis ICD: R05

## 2020-09-06 ENCOUNTER — COMMUNICATION - HEALTHEAST (OUTPATIENT)
Dept: SCHEDULING | Facility: CLINIC | Age: 35
End: 2020-09-06

## 2021-05-30 VITALS — WEIGHT: 121 LBS | HEIGHT: 64 IN | BODY MASS INDEX: 20.66 KG/M2

## 2021-05-31 ENCOUNTER — RECORDS - HEALTHEAST (OUTPATIENT)
Dept: ADMINISTRATIVE | Facility: CLINIC | Age: 36
End: 2021-05-31

## 2021-05-31 VITALS — HEIGHT: 66 IN | BODY MASS INDEX: 19.38 KG/M2 | WEIGHT: 120.6 LBS

## 2021-06-01 ENCOUNTER — RECORDS - HEALTHEAST (OUTPATIENT)
Dept: ADMINISTRATIVE | Facility: CLINIC | Age: 36
End: 2021-06-01

## 2021-06-01 VITALS — HEIGHT: 64 IN | WEIGHT: 116 LBS | BODY MASS INDEX: 19.81 KG/M2

## 2021-06-02 ENCOUNTER — RECORDS - HEALTHEAST (OUTPATIENT)
Dept: ADMINISTRATIVE | Facility: CLINIC | Age: 36
End: 2021-06-02

## 2021-06-02 VITALS — BODY MASS INDEX: 22.96 KG/M2 | WEIGHT: 133.75 LBS

## 2021-06-03 VITALS
HEIGHT: 64 IN | WEIGHT: 131 LBS | HEART RATE: 80 BPM | BODY MASS INDEX: 22.36 KG/M2 | DIASTOLIC BLOOD PRESSURE: 70 MMHG | SYSTOLIC BLOOD PRESSURE: 100 MMHG

## 2021-06-03 NOTE — PATIENT INSTRUCTIONS - HE
For your heavy cycles:    Try 600-800mg every 6-8 hours during your heavy days.   You could consider low dose birth control pills  If you are interested in Lysteda, call or send a [employer] message to request a prescriptions    Please return in 1 month or so for your annual exam, make it early and come fasting so we can do labs    So jennifer to meet you today    DLATON Velásquez,MAGGIM

## 2021-06-03 NOTE — PROGRESS NOTES
Assessment:     34 yo   New CNM client  Mood disorder- unsure if Bipolar or Borderline personality disorder  Hx of LEEP in 2005, due for a pap  Disordered eating  Hx of substance use disorder, in a treatment program  Cigarette smoker  Mirena IUD removed per pt request  Due for annual exam  STD screen  Heavy menstrual cycles      Plan:   1. Encouraged Amol to return soon for her annual exam, if she comes fasting we can draw cholesterol and glucose.  2. Mirena removal: Discussed with Amol that the Mirena should help to reduce her bleeding, not increase it. Also expressed concern that she is changing a lot with her body all at once (going off Seroquel, remission from cocaine use) and that she may want to delay the Mirena removal to see how she feels. Amol is adamant that she would like it removed today as she has noticed increased menstrual bleeding with the IUD  -Warning signs were reviewed with the patient including bleeding, pain and infection  3. Heavy menstrual cycles: encouraged Amol to track her bleeding carefully so we can determine her pattern now that her IUD is removed. If her bleeding is heavy we can consider ibuprofen use, Lysteda, or a form of birth control to control her bleeding.   4. Contraception: Amol would like to avoid using any medications right now, including contraception. She plans to be abstinent or will use condoms if she is sexually active.   5. STD screen: Amol recently had a full STD screen, desires HSV screening today. Discussed the nature of HSV and that the tests could be positive from an exposure years ago.   6. Mental health/substance use recovery: encouraged Amol to continue with her treatment program and mental health therapy.   -Encouraged her to quit smoking, reviewed options for nicotine replacement therapy. Will discuss further at her annual exam.     TT with patient 45 mns >50% time spent in counseling or coordination of care.     Subjective:       Amol DUTCH De La Omarnie is a  33 y.o. female  who presents for IUD removal.   She had a Mirena inserted 16. She has noticed increased bleeding with each cycle since having her Mirena placed.   Amol is in patient at [facility] - cocaine and alcohol  She last used Cocaine 10/22/19. She will be inpatient with this group for one more week.   She recently overdosed on her Seroquel, denies current plan to harm herself or others  Amol is in therapy and her therapists/providers are wondering if she has borderline personality disorder rather than Bipolar disorder. She is now off all of her medications and would like to discontinue her Mirena as well.      Has had a history of being sober for 10 years, she was sober until May.   She is hoping this will be her last time in treatment  Amol had the IUD three years ago. She never felt like her cycles improved. She has had heavy cycles since having the Mirena. She doesn't like that she has a foreign body in her body and wants to removed today  LMP: 19, still bleeding, typically bleeds for 7 days, heavy, changing a pad every couple of hours  Amol gets frequent urinary tract infections and bacterial vaginosis infections. No symptoms today  Not currently sexually active  Recently tested for STDs three weeks ago, she was negative for GC/CT.   Amol is concerned about HSV and would like to be screened. She has never noticed any cold sores or genital lesions. This test was not included in the STD screen she had three weeks ago  Declines the other tests today    Review of Systems  A 12 point comprehensive review of systems was negative except as noted.      Objective:     PROCEDURE:  A speculum was placed and the IUD strings were visualized.  The IUD strings were grasped with a ring forceps and gentle traction was applied.  The Mirena IUD was easily removed and was noted to be intact.  The speculum was then removed.    Physical Exam:  General Appearance: Alert, cooperative, no  distress, appears stated age  Lungs:  respirations unlabored  Vulva:  no sign of lesions or condyloma, normal hair distribution  Vagina: pink, normal rugae, no abnormal discharge  Cervix:  long/thick/closed, no lesions or inflammation noted, negative CMT with exam  Uterus: displaced toward left side of body, non tender, normal size  Adnexa:  no masses appreciated, non-tender with palpation  Extremities: Extremities normal, atraumatic, no cyanosis or edema  Skin: Skin color, texture, turgor normal, no rashes or lesions, multiple tattoos on body  Lymph nodes: Cervical, supraclavicular, and axillary nodes normal  Neurologic: Alert and oriented x 3.

## 2021-06-05 ENCOUNTER — RECORDS - HEALTHEAST (OUTPATIENT)
Dept: ADMINISTRATIVE | Facility: CLINIC | Age: 36
End: 2021-06-05

## 2021-06-09 NOTE — PROGRESS NOTES
"SUBJECTIVE: Amol Corado is a 31 y.o. female with:  Chief Complaint   Patient presents with     Urinary Tract Infection     pt states that she is pretty prone to them, had a recent one in Jan 2017     Dysuria     x 1 week     Urinary Frequency     x 1 week     Cloudy urine     x 1 week     Questions For Providers/results     maybe looking into something prevenative and or if IUD can help cause    She was recently hospitalized January 2017 for suicide attempt.  She took OD lithium and had relapse of EtoH abuse.  She had cloudy urine then urine frequency/burning in the hospital at [facility] and she was treated with Cipro.  Her symptoms improved and were better at discharge.  She started to have symptoms about 1 week ago.  She has cloudy urine/ frequency/ dysuria.  She feels sluggish with low grade fever.  She has nausea but no vomiting.  She has had slight low back pain.  Last night she was very uncomfortable.  No hematuria or vaginal discharge.  She has Mirena IUD.  She is sexually active with partner of 9 months.    Patient Active Problem List   Diagnosis     Abnormal Pap Smear Of Cervix     Anxiety     Alcohol use disorder, moderate, in controlled environment, dependence     Intentional lithium overdose     Suicide attempt by drug ingestion, initial encounter     Normocytic anemia     Bipolar 2 disorder, major depressive episode     Bipolar 1 disorder     neuroleptic consent discussed and signed 1/24/17     Acute midline low back pain without sciatica     UTI (urinary tract infection), uncomplicated      OBJECTIVE:   Visit Vitals     /60 (Patient Site: Left Arm, Patient Position: Sitting, Cuff Size: Adult Regular)     Pulse 64     Temp 97.7  F (36.5  C) (Oral)     Resp 12     Ht 5' 4\" (1.626 m)     Wt 121 lb (54.9 kg)     Breastfeeding No     BMI 20.77 kg/m2    no distress  Back: No CVA or vertebral tenderness.  Abdomen: Soft.  NT. ND. No HSM or masses.    Recent Results (from the past 240 hour(s))   HM2(CBC " w/o Differential)   Result Value Ref Range    WBC 3.3 (L) 4.0 - 11.0 thou/uL    RBC 4.10 3.80 - 5.40 mill/uL    Hemoglobin 11.2 (L) 12.0 - 16.0 g/dL    Hematocrit 33.8 (L) 35.0 - 47.0 %    MCV 82 80 - 100 fL    MCH 27.4 27.0 - 34.0 pg    MCHC 33.3 32.0 - 36.0 g/dL    RDW 17.5 (H) 11.0 - 14.5 %    Platelets 215 140 - 440 thou/uL    MPV 7.9 7.0 - 10.0 fL   Iron and Transferrin Iron Binding Capacity   Result Value Ref Range    Iron 94 42 - 175 ug/dL    Transferrin 320 212 - 360 mg/dL    Transferrin Saturation 24 20 - 50 %    Transferrin  313 - 563 ug/dL   Glycosylated Hemoglobin A1C   Result Value Ref Range    Hemoglobin A1c 5.5 3.5 - 6.0 %   Hepatic Profile   Result Value Ref Range    Bilirubin, Total 0.4 0.0 - 1.0 mg/dL    Bilirubin, Direct 0.1 <=0.5 mg/dL    Protein, Total 7.0 6.0 - 8.0 g/dL    Albumin 3.8 3.5 - 5.0 g/dL    Alkaline Phosphatase 48 45 - 120 U/L    AST 15 0 - 40 U/L    ALT 12 0 - 45 U/L   Valproic Acid (Depakene )   Result Value Ref Range    Valproic Acid 56.3 50.0 - 150.0 ug/mL   Urinalysis-UC if Indicated   Result Value Ref Range    Color, UA Yellow Colorless, Yellow, Straw, Light Yellow    Clarity, UA Cloudy (!) Clear    Glucose, UA Negative Negative    Bilirubin, UA Negative Negative    Ketones, UA Trace (!) Negative    Specific Gravity, UA 1.020 1.005 - 1.030    Blood, UA Negative Negative    pH, UA 7.0 5.0 - 8.0    Protein, UA Negative Negative mg/dL    Urobilinogen, UA 0.2 E.U./dL 0.2 E.U./dL, 1.0 E.U./dL    Nitrite, UA Positive (!) Negative    Leukocytes, UA Negative Negative    Bacteria, UA Moderate (!) None Seen hpf    RBC, UA None Seen None Seen, 0-2 hpf    WBC, UA 0-5 None Seen, 0-5 hpf    Squam Epithel, UA 0-5 None Seen, 0-5 lpf    Amorphous, UA Moderate (!) None Seen    Mucus, UA Few (!) None Seen lpf     Amol was seen today for urinary tract infection, dysuria, urinary frequency, cloudy urine and questions for providers/results.    Diagnoses and all orders for this visit:    UTI  (urinary tract infection)  -     Urinalysis-UC if Indicated  -     ciprofloxacin HCl (CIPRO) 250 MG tablet; Take 1 tablet (250 mg total) by mouth 2 (two) times a day for 7 days.  -     Culture, Urine     Take vitamin D3 2000 IU daily.    Cut back on sugar.    Try to get regular sleep and gentle exercise.    Try to urinate after sex on a regular basis.    Marisol Daniel

## 2021-06-13 NOTE — PROGRESS NOTES
[facility] Clinic Note    Amol Franz   31 y.o. female    Date of Visit: 10/4/2017  Chief Complaint   Patient presents with     Anxiety       ASSESSMENT/PLAN  1. Anxiety     2. Bipolar 2 disorder, major depressive episode     3. Alcohol use disorder, moderate, in controlled environment, dependence       ---------------------------------------------    Amol Franz is here for recent anxiety problems, had trouble waiting until next week to see Nora Correa at [facility].  She has a long complicated history of psychiatric disorders including borderline personality disorder, alcohol dependence, suicide attempt earlier this year.  He tells me that depression is well managed, would like something for anxiety.  As a rule, I do not prescribe controlled substances on the first visit, and I would like input from her to health provider.  I have called their office, and requested a call back later today on my cell phone if possible.  He wanted something in lieu of the benzodiazepines, so I prescribed 20 tablets of trazodone, instruction  g twice a day as needed for anxiety.    I also provided a work release for the next 2-3 days.    Return if symptoms worsen or fail to improve.      SUBJECTIVE  Amol Franz is a 31-year-old woman presents for acute visit, not to establish care.  Her primary concern is anxiety, and has been uncontrolled lately.  She called into work today, because she could not bear being there, where she works as a [employer].  She had a panic attack while driving in the car.  She has history of alcohol dependence, currently in remission.  She also has history of suicidal attempt earlier this year, voluntary brief 2 day admission September 6 through the eighth for suicidal ideation, had her medications adjusted, started on risperidone and mirtazapine, though the mirtazapine caused her to gain weight and get extra hungry.  She also states that she just did not feel well on  it.    She specifically wants a medication for anxiety.  Lorazepam and alprazolam have worked for her in the past in the short-term.  She has also been on buspirone, Vistaril (bad side effects), trazodone (somewhat helpful), propranolol, and the latest gabapentin has not been helpful.  [facility] has [facility].  She had to schedule a long ways out.  She is seeing a therapist named Alanna as well.    It sounds like she has been on a number of mood stabilizers, Trileptal, most recently risperidone.  She has history of suicide attempt with lithium.    The most comprehensive list of mental health disorders I can find includes PTSD, anxiety (generalized), bipolar type I, borderline personality disorder, alcohol dependence in remission, eating disorder, history of suicide attempt.    ROS A comprehensive review of systems was performed and was otherwise negative    Medications, allergies, and problem list were reviewed and updated    Patient Active Problem List   Diagnosis     Abnormal Pap Smear Of Cervix     Anxiety     Alcohol use disorder, moderate, in controlled environment, dependence     Intentional lithium overdose     Normocytic anemia     Bipolar 2 disorder, major depressive episode     NCF discussed and signed 9/7/17     Acute midline low back pain without sciatica     Past Medical History:   Diagnosis Date     Abdominal pain      Anxiety      Bipolar 1 disorder      Bipolar disorder      Drug dependence, in remission      Lithium overdose      Pancreatitis      Suicidal ideation 9/6/2017     Suicide attempt by drug ingestion, initial encounter 1/14/2017     Urinary tract infection      No past surgical history on file.  Social History     Social History     Marital status:      Spouse name: N/A     Number of children: N/A     Years of education: N/A     Occupational History      [employer]     Social History Main Topics     Smoking  "status: Former Smoker     Packs/day: 0.25     Years: 15.00     Types: Cigarettes     Start date: 11/6/1998     Smokeless tobacco: Former User     Alcohol use Yes      Comment: Relpased after 3 years sober 1/5/2017. a bottle every day     Drug use: Yes     Special: Marijuana, Heroin      Comment: used marijuaua 2 times in last month, lslast use was 2 days ago     Sexual activity: Not on file     Other Topics Concern     Not on file     Social History Narrative     Family History   Problem Relation Age of Onset     Asthma Mother      Colon cancer Mother      Alcohol abuse Father      Depression Father      Early death Father      Heart disease Father      Mental illness Father      Prostate cancer Father      Stroke Father      Asthma Son      Diabetes Maternal Aunt      Cancer Maternal Grandmother      Breast cancer Paternal Grandmother      Stroke Paternal Grandmother      Heart disease Paternal Grandfather      Stroke Paternal Grandfather        Current Outpatient Prescriptions   Medication Sig Dispense Refill     levonorgestrel (MIRENA) 20 mcg/24 hr (5 years) IUD 1 each by Intrauterine route once.       risperiDONE (RISPERDAL) 1 MG tablet Take 1 tablet (1 mg total) by mouth 2 (two) times a day. 60 tablet 0     traZODone (DESYREL) 50 MG tablet Take 1-2 tablets ( mg total) by mouth 2 (two) times a day as needed (anxiety). 20 tablet 0     No current facility-administered medications for this visit.        Allergies   Allergen Reactions     Bactrim [Sulfamethoxazole-Trimethoprim] Other (See Comments)     Other reaction(s): Other (See Comments)  pancreatitis  pancreatitis     Cats Claw (Uncaria [Cat's Claw (Uncaria Tomentosa)] Itching     Mirtazapine      Weight gain, polyphagia, not feeling well in general       EXAM  Vitals:    10/04/17 1058   BP: 104/60   Pulse: 75   SpO2: 99%   Weight: 120 lb 9.6 oz (54.7 kg)   Height: 5' 5.5\" (1.664 m)     General: Alert, no distress  Psychiatric: Fairly flat affect, " pleasant, anxious mood.  Tells me that the depression is well managed currently.    RESULTS REVIEWED:     ANALYSIS AND SUMMARY OF OLD RECORDS, NOTES AND CONSULTS (2): Reviewed discharge summary from 9/8/17    RECORDS REQUESTED (1): Release of records requested so we could talk to her psychiatrist.     OTHER HISTORY SUMMARIZED (from nursing staff, family, friends) (2): Called psychiatrist, message was not returned at the end of clinic day on 10/4    RADIOLOGY TESTS SUMMARIZED or REQUESTED (XRAY/CT/MRI/DXA) (1):     MEDICINE TESTS SUMMARIZED or REQUESTED (EKG/ECHO/COLONOSCOPY/EGD) (1): None    INDEPENDENT REVIEW OF EKG OR X-RAY (2): None.    Data points  3    Victoriano Isabel DO  Internal Medicine  [facility]

## 2021-06-17 NOTE — PROGRESS NOTES
"OFFICE VISIT - FAMILY MEDICINE     ASSESSMENT AND PLAN     1. Bipolar 1 disorder, depressed  lithium 300 MG capsule    OLANZapine (ZYPREXA) 5 MG tablet    OLANZapine (ZYPREXA) 10 MG tablet    Comprehensive Metabolic Panel    Thyroid Stimulating Hormone (TSH)    Lithium    Glycosylated Hemoglobin A1c    HM1(CBC and Differential)    HM1 (CBC with Diff)   2. PTSD (post-traumatic stress disorder)  Comprehensive Metabolic Panel    Thyroid Stimulating Hormone (TSH)    Lithium    Glycosylated Hemoglobin A1c    HM1(CBC and Differential)    HM1 (CBC with Diff)   Bipolar and PTSD, has been managed by psychiatrist, she will continue to follow with him, we are doing some lab work to follow-up on the medication just recently started.  Has been tolerated well, has not experienced any significant side effects.  She is aware that she will be due for a general physical exam any time soon.  CHIEF COMPLAINT   Depression    HPI   Amol Franz is a 32 y.o. female.  Updated MIIC    Has been seeing a psychiatrist, next appointment is in mid May, she will be running out of medication in the meantime, she needs a refill of her Zyprexa and lithium, has been diagnosed of bipolar type I, and posttraumatic stress disorder, overall has been doing fine, she is nonsuicidal.  She was started on lithium about a month ago.  Trazodone has been discontinued by the psychiatrist.  Substance abuse has not been an issue recently.  Recently seen in the ER for acute pyelonephritis, symptoms have now subsided.    Review of Systems As per HPI, otherwise negative.    OBJECTIVE   BP 92/62 (Patient Site: Left Arm, Patient Position: Sitting, Cuff Size: Adult Regular)  Pulse 92  Ht 5' 4\" (1.626 m)  Wt 116 lb (52.6 kg)  SpO2 98%  BMI 19.91 kg/m2  Physical Exam   Constitutional: She is oriented to person, place, and time. She appears well-developed and well-nourished.   HENT:   Head: Normocephalic and atraumatic.   Neck: Normal range of motion. Neck supple. " No JVD present. No tracheal deviation present. No thyromegaly present.   Cardiovascular: Normal rate, regular rhythm, normal heart sounds and intact distal pulses.  Exam reveals no gallop and no friction rub.    No murmur heard.  Pulmonary/Chest: Effort normal and breath sounds normal. No respiratory distress. She has no wheezes. She has no rales.   Musculoskeletal: She exhibits no edema or tenderness.   Lymphadenopathy:     She has no cervical adenopathy.   Neurological: She is alert and oriented to person, place, and time. Coordination normal.   Psychiatric: She has a normal mood and affect. Judgment and thought content normal.       PFSH     Family History   Problem Relation Age of Onset     Asthma Mother      Colon cancer Mother      Alcohol abuse Father      Depression Father      Early death Father      Heart disease Father      Mental illness Father      Prostate cancer Father      Stroke Father      Asthma Son      Diabetes Maternal Aunt      Cancer Maternal Grandmother      Breast cancer Paternal Grandmother      Stroke Paternal Grandmother      Heart disease Paternal Grandfather      Stroke Paternal Grandfather      Social History     Social History     Marital status:      Spouse name: N/A     Number of children: N/A     Years of education: N/A     Occupational History      [employer]     Social History Main Topics     Smoking status: Former Smoker     Packs/day: 0.25     Years: 15.00     Types: Cigarettes     Start date: 11/6/1998     Smokeless tobacco: Former User     Alcohol use Yes      Comment: Relpased after 3 years sober 1/5/2017. a bottle every day     Drug use: Yes     Special: Marijuana, Heroin      Comment: used marijuaua 2 times in last month, lslast use was 2 days ago     Sexual activity: Not on file     Other Topics Concern     Not on file     Social History Narrative     Relevant history was reviewed with the patient today, unless noted in HPI, nothing is pertinent for this visit.  [facility]      Patient Active Problem List    Diagnosis Date Noted     NCF discussed and signed 9/7/17 09/07/2017     Acute midline low back pain without sciatica      Bipolar 2 disorder, major depressive episode 01/15/2017     Intentional lithium overdose 01/14/2017     Normocytic anemia 01/14/2017     Alcohol use disorder, moderate, in controlled environment, dependence      Abnormal Pap Smear Of Cervix      Overview Note:     Created by Conversion         Anxiety      Overview Note:     Created by Conversion    Replacement Utility updated for latest IMO load       No past surgical history on file.    RESULTS/CONSULTS (Lab/Rad)   No results found for this or any previous visit (from the past 168 hour(s)).  No results found.  MEDICATIONS     Current Outpatient Prescriptions on File Prior to Visit   Medication Sig Dispense Refill     levonorgestrel (MIRENA) 20 mcg/24 hr (5 years) IUD 1 each by Intrauterine route once.       phenazopyridine (PYRIDIUM) 200 MG tablet Take 1 tablet (200 mg total) by mouth 3 (three) times a day as needed for pain. 6 tablet 0     risperiDONE (RISPERDAL) 1 MG tablet Take 1 tablet (1 mg total) by mouth 2 (two) times a day. 60 tablet 0     traZODone (DESYREL) 50 MG tablet Take 1-2 tablets ( mg total) by mouth 2 (two) times a day as needed (anxiety). 20 tablet 0     No current facility-administered medications on file prior to visit.        HEALTH MAINTENANCE / SCREENING   No Data Recorded, No Data Recorded,JONG-7 Total: 21 (10/4/2017 10:00 AM)  Immunization History   Administered Date(s) Administered     Hep A, historic 12/14/2009     Hep B, historic 05/08/1997, 05/07/1998, 10/15/1998     Influenza Y2f7-16, 12/14/2009     Influenza, inj, historic,unspecified 12/14/2009     MMR 05/08/1997     Td,adult,historic,unspecified 05/08/1997     Tdap 12/14/2009     Health Maintenance   Topic     INFLUENZA VACCINE RULE BASED (1)     TDAP ADULT ONE TIME DOSE      PAP SMEAR      ADVANCE DIRECTIVES DISCUSSED  WITH PATIENT      TD 18+ HE      The following are part of a depression follow up plan for the patient:  under care of mental health counselor and implementation of measures to provide psychological support  Juan Martinez MD  Family Medicine, [facility]     This note was dictated using a voice recognition software.  Any grammatical or context distortion are unintentional and inherent to the software.

## 2021-06-21 NOTE — PROGRESS NOTES
Assessment & Plan   1. Recurrent UTI  Urinalysis with large number of white blood cells today, suggesting UTI.  Will confirm with urine culture.  Treat with Macrobid.  Did discuss her history of frequent urinary tract infections and confirmed this has consistently been seen in urinalysis as well.  Discussed possibility of self-directed therapy and she is interested in this.  We will send in several refills for her Macrobid.  She was advised to come into clinic if she does not experience improvement with the Macrobid or if urinary tract infections become more frequent than 6 a year.  - Urinalysis-UC if Indicated  - Culture, Urine  - nitrofurantoin, macrocrystal-monohydrate, (MACROBID) 100 MG capsule; Take 1 capsule (100 mg total) by mouth 2 (two) times a day for 5 days.  Dispense: 10 capsule; Refill: 5    Daisy Martinez CNP      Subjective   Chief Complaint:  Dysuria (x 1 week)    HPI:   Amol Corado is a 32 y.o. female who presents for dysuria    She states symptoms began 1 week ago.  Complains of discomfort with urination, frequency.  Initially at onset did have some chills and body aches though no symptoms of fever since that time.  Did have some low back pain yesterday but that did resolve.  No blood in the urine.    She does have a history of frequent urinary tract infections when she is in a relationship and having intercourse.  She states with her previous relationship she experienced 6 UTIs in 1 year.  She just began a new relationship this past month so she did expect to have a UTI.  She wonders about the ability to call in for prescription when she is experiencing symptoms.  Upon chart review she has had 2 UTIs this year.  Urinalysis has confirmed presence of the infection.        Allergies:  is allergic to bactrim [sulfamethoxazole-trimethoprim] and mirtazapine.    Review of Systems:  A complete head to toe ROS is negative unless otherwise noted in HPI    Objective     Vitals:    10/31/18 1529   BP: 106/70    Patient Site: Right Arm   Patient Position: Sitting   Cuff Size: Adult Regular   Weight: 133 lb 12 oz (60.7 kg)       Physical Exam:  GENERAL: Alert, well appearing female  ABDOMEN: BS+. Abdomen soft, nontender to palpation without guarding.    FEMALE: No CVA tenderness

## 2021-08-03 PROBLEM — F39 MOOD DISORDER (H): Status: RESOLVED | Noted: 2017-01-20 | Resolved: 2017-01-24

## 2021-08-03 PROBLEM — T50.902A SUICIDE ATTEMPT BY DRUG INGESTION, INITIAL ENCOUNTER (H): Status: RESOLVED | Noted: 2017-01-14 | Resolved: 2017-10-04

## 2021-08-03 PROBLEM — T56.892A: Status: RESOLVED | Noted: 2017-01-14 | Resolved: 2018-07-30

## 2021-09-05 ENCOUNTER — HEALTH MAINTENANCE LETTER (OUTPATIENT)
Age: 36
End: 2021-09-05

## 2021-09-21 ENCOUNTER — OFFICE VISIT (OUTPATIENT)
Dept: FAMILY MEDICINE | Facility: CLINIC | Age: 36
End: 2021-09-21
Payer: COMMERCIAL

## 2021-09-21 VITALS
HEART RATE: 88 BPM | BODY MASS INDEX: 27.86 KG/M2 | WEIGHT: 163.2 LBS | RESPIRATION RATE: 14 BRPM | HEIGHT: 64 IN | DIASTOLIC BLOOD PRESSURE: 46 MMHG | OXYGEN SATURATION: 98 % | TEMPERATURE: 98.4 F | SYSTOLIC BLOOD PRESSURE: 80 MMHG

## 2021-09-21 DIAGNOSIS — R22.43 LOCALIZED SWELLING OF BOTH LOWER LEGS: ICD-10-CM

## 2021-09-21 DIAGNOSIS — F10.20 ALCOHOL USE DISORDER, SEVERE, DEPENDENCE (H): ICD-10-CM

## 2021-09-21 DIAGNOSIS — N30.00 ACUTE CYSTITIS WITHOUT HEMATURIA: ICD-10-CM

## 2021-09-21 DIAGNOSIS — R30.0 DYSURIA: Primary | ICD-10-CM

## 2021-09-21 DIAGNOSIS — F31.9 BIPOLAR 1 DISORDER, DEPRESSED (H): ICD-10-CM

## 2021-09-21 DIAGNOSIS — D64.89 ANEMIA DUE TO OTHER CAUSE, NOT CLASSIFIED: ICD-10-CM

## 2021-09-21 DIAGNOSIS — F43.10 PTSD (POST-TRAUMATIC STRESS DISORDER): ICD-10-CM

## 2021-09-21 DIAGNOSIS — F11.20 OPIOID USE DISORDER, SEVERE, DEPENDENCE (H): ICD-10-CM

## 2021-09-21 DIAGNOSIS — R20.2 PARESTHESIA: ICD-10-CM

## 2021-09-21 LAB
ALBUMIN SERPL-MCNC: 3.3 G/DL (ref 3.5–5)
ALBUMIN UR-MCNC: NEGATIVE MG/DL
ALP SERPL-CCNC: 72 U/L (ref 45–120)
ALT SERPL W P-5'-P-CCNC: 28 U/L (ref 0–45)
ANION GAP SERPL CALCULATED.3IONS-SCNC: 7 MMOL/L (ref 5–18)
APPEARANCE UR: ABNORMAL
AST SERPL W P-5'-P-CCNC: 36 U/L (ref 0–40)
BACTERIA #/AREA URNS HPF: ABNORMAL /HPF
BILIRUB SERPL-MCNC: 0.2 MG/DL (ref 0–1)
BILIRUB UR QL STRIP: NEGATIVE
BUN SERPL-MCNC: 7 MG/DL (ref 8–22)
CALCIUM SERPL-MCNC: 9 MG/DL (ref 8.5–10.5)
CHLORIDE BLD-SCNC: 104 MMOL/L (ref 98–107)
CO2 SERPL-SCNC: 26 MMOL/L (ref 22–31)
COLOR UR AUTO: YELLOW
CREAT SERPL-MCNC: 0.67 MG/DL (ref 0.6–1.1)
GFR SERPL CREATININE-BSD FRML MDRD: >90 ML/MIN/1.73M2
GLUCOSE BLD-MCNC: 121 MG/DL (ref 70–125)
GLUCOSE UR STRIP-MCNC: NEGATIVE MG/DL
HGB UR QL STRIP: NEGATIVE
KETONES UR STRIP-MCNC: NEGATIVE MG/DL
LEUKOCYTE ESTERASE UR QL STRIP: ABNORMAL
LIPASE SERPL-CCNC: 16 U/L (ref 0–52)
NITRATE UR QL: POSITIVE
PH UR STRIP: 5 [PH] (ref 5–7)
POTASSIUM BLD-SCNC: 4 MMOL/L (ref 3.5–5)
PROT SERPL-MCNC: 6.5 G/DL (ref 6–8)
RBC #/AREA URNS AUTO: ABNORMAL /HPF
SODIUM SERPL-SCNC: 137 MMOL/L (ref 136–145)
SP GR UR STRIP: 1.01 (ref 1–1.03)
SQUAMOUS #/AREA URNS AUTO: ABNORMAL /LPF
UROBILINOGEN UR STRIP-ACNC: 0.2 E.U./DL
WBC #/AREA URNS AUTO: ABNORMAL /HPF

## 2021-09-21 PROCEDURE — 87086 URINE CULTURE/COLONY COUNT: CPT | Performed by: FAMILY MEDICINE

## 2021-09-21 PROCEDURE — 83690 ASSAY OF LIPASE: CPT | Performed by: FAMILY MEDICINE

## 2021-09-21 PROCEDURE — 81001 URINALYSIS AUTO W/SCOPE: CPT | Performed by: FAMILY MEDICINE

## 2021-09-21 PROCEDURE — 85027 COMPLETE CBC AUTOMATED: CPT | Performed by: FAMILY MEDICINE

## 2021-09-21 PROCEDURE — 87186 SC STD MICRODIL/AGAR DIL: CPT | Performed by: FAMILY MEDICINE

## 2021-09-21 PROCEDURE — 99214 OFFICE O/P EST MOD 30 MIN: CPT | Performed by: FAMILY MEDICINE

## 2021-09-21 PROCEDURE — 36415 COLL VENOUS BLD VENIPUNCTURE: CPT | Performed by: FAMILY MEDICINE

## 2021-09-21 PROCEDURE — 80053 COMPREHEN METABOLIC PANEL: CPT | Performed by: FAMILY MEDICINE

## 2021-09-21 RX ORDER — QUETIAPINE FUMARATE 100 MG/1
TABLET, FILM COATED ORAL
COMMUNITY
Start: 2021-09-02 | End: 2022-02-24

## 2021-09-21 RX ORDER — FLUOXETINE 40 MG/1
CAPSULE ORAL
COMMUNITY
Start: 2021-08-30 | End: 2022-12-23

## 2021-09-21 RX ORDER — BUPRENORPHINE HYDROCHLORIDE, NALOXONE HYDROCHLORIDE 8; 2 MG/1; MG/1
FILM, SOLUBLE BUCCAL; SUBLINGUAL
COMMUNITY
Start: 2021-09-17 | End: 2022-12-22

## 2021-09-21 RX ORDER — OLANZAPINE 10 MG/1
TABLET ORAL
COMMUNITY
Start: 2021-09-21 | End: 2022-12-22

## 2021-09-21 RX ORDER — MIRTAZAPINE 30 MG/1
TABLET, FILM COATED ORAL
COMMUNITY
Start: 2021-09-02 | End: 2022-02-24

## 2021-09-21 RX ORDER — MULTIVITAMIN WITH FOLIC ACID 400 MCG
TABLET ORAL
COMMUNITY
Start: 2021-08-10 | End: 2022-02-24

## 2021-09-21 RX ORDER — CLONIDINE HYDROCHLORIDE 0.1 MG/1
TABLET ORAL
COMMUNITY
Start: 2021-09-02 | End: 2022-02-24

## 2021-09-21 RX ORDER — TRAZODONE HYDROCHLORIDE 100 MG/1
TABLET ORAL
COMMUNITY
Start: 2021-09-02 | End: 2022-02-24

## 2021-09-21 ASSESSMENT — MIFFLIN-ST. JEOR: SCORE: 1415.52

## 2021-09-21 NOTE — PROGRESS NOTES
ASSESSMENT:  1. Dysuria  Clinic staff originally mistakenly ordered urine culture only urinalysis order placed results were pending at time of patient departure  - Urine Culture Aerobic Bacterial - lab collect  - CBC with platelets; Future  - UA Macro with Reflex to Micro and Culture - lab collect; Future  - CBC with platelets  - UA Macro with Reflex to Micro and Culture - lab collect    2. Bipolar 1 disorder, depressed (H)  Patient taking medications regularly she did not specify if she is in psychiatric treatment.  No cb noted currently.    3. Opioid use disorder, severe, dependence (H)    Currently denies using any opioids.  Has counseling for alcohol use disorder.    4. PTSD (post-traumatic stress disorder)    Patient is very worried that she has pancreatitis.  Concerned that her abdominal symptoms are signaling repeat attack of pancreatitis has not been able to sleep well.  - Lipase; Future  - Lipase    5. Alcohol use disorder, severe, dependence (H)    No use of alcohol for more than 2 months was in inpatient treatment for months and now is living in a safe house.  Repeat liver function tests today.  - Comprehensive metabolic panel (BMP + Alb, Alk Phos, ALT, AST, Total. Bili, TP); Future  - Lipase; Future  - CBC with platelets; Future  - Comprehensive metabolic panel (BMP + Alb, Alk Phos, ALT, AST, Total. Bili, TP)  - Lipase  - CBC with platelets    6. Paresthesia  Numbness and tingling noted in the upper extremity especially the hands that wake her up from sleep no functional disturbance noted today gross sensation is normal.    7. Localized swelling of both lower legs    Patient experiences localized swelling in the lower legs no weight changes.  No pedal edema noted gait is unaffected      8.  Acute cystitis with hematuria    Results were available after patient had left clinic staff will call with results ciprofloxacin will be started for the patient    9.  Anemia due to other cause hemoglobin was 8.9.   This could describe a paresthesia weakness.  We will have her return to clinic after her cystitis is resolved for future studies and evaluation.  MCV MCH indicate different etiology than AKI.      Patient Instructions   We have ordered some tests for you today      The test results will be made available tomorrow I will call you with those test results.      Orders Placed This Encounter   Procedures     Comprehensive metabolic panel (BMP + Alb, Alk Phos, ALT, AST, Total. Bili, TP)     Lipase     CBC with platelets     UA Macro with Reflex to Micro and Culture - lab collect     There are no discontinued medications.    Return in about 3 months (around 12/21/2021) for Routine preventive.    CHIEF COMPLAINT:  chief complaint    HISTORY OF PRESENT ILLNESS:  Amol is a 35 year old female who is visiting this clinic for the first time.  For the last 2 weeks she has experienced swelling in her ankles.  She is also experienced numbness and tingling in her hands that wakes her up at night and is painful.  She also notes that she has symptoms suggestive of a urinary tract infection she has had some abdominal pain and burning when she urinates.  This has been going on for approximately 2 weeks she has a medical history significant for street drug use disorder, frequent hospitalization with pancreatitis and alcohol use disorder and bipolar disorder.  She was recently in inpatient treatment for alcohol use disorder and now is living in a safe house for the last month she has not had alcohol for 2 months she continues to vape.  She denies using any street drugs.  She is sexually active.  She denies any chest pain states that her appetite is normal.  Is been taking all her medications.  She states that her sleep is been disturbed because of her hand pain which feels like a cramping and numbness in both hands.  She denies any shortness of breath.  She denies any nausea or vomiting.  No diarrhea is reported no fevers reported.  She  "has not had any problems swallowing.  She states that she feels that her legs are swollen but she has been able to walk no falls have been reported.    She states over the last 2 to 3 years she has gained more than 40 pounds because of her medications for bipolar disorder.    REVIEW OF SYSTEMS:     General feels fatigued because of poor sleep  Musculoskeletal complains of cramps in her hands only at night and cramps in her ankles and feet.  GI positive for abdominal pain positive for occasional heartburn   positive for increased frequency of urination which is painful  12 point review of  All other systems are negative.    PFSH:    Not working at this time    TOBACCO USE:  History   Smoking Status     Current Every Day Smoker     Packs/day: 0.25     Years: 15.00     Types: Cigarettes, Cigarettes     Start date: 11/6/1998   Smokeless Tobacco     Former User     Comment: Reports smoking e-cigarettes daily.       VITALS:  Vitals:    09/21/21 1618   BP: (!) 80/46   Pulse: 88   Resp: 14   Temp: 98.4  F (36.9  C)   TempSrc: Oral   SpO2: 98%   Weight: 74 kg (163 lb 3.2 oz)   Height: 1.626 m (5' 4.02\")     Wt Readings from Last 3 Encounters:   09/21/21 74 kg (163 lb 3.2 oz)   11/14/19 59.4 kg (131 lb)   07/21/19 59 kg (130 lb)       PHYSICAL EXAM:    Interactive overweight  female sitting in exam room no acute distress  HEENT neck supple mucous members moist, oral cavity shows no exudate erythema there is no lymph enlargement noted in the neck.  There is no sinus tenderness.  Respiratory system clear to auscultation equal breath sounds no wheezes no crackles  CVS regular rate and rhythm no murmurs no rubs no gallops no pedal edema no cyanosis noted in the upper extremities  Abdomen soft there is no focal tenderness no hepatosplenomegaly.  Musculoskeletal system no tenderness elicited over palpation of the cervical thoracic or lumbar spine.  Neuro cranial nerves II to XII intact gait is normal reflexes are brisk " motor tone in the upper lower extremities is normal.  Psych well groomed oriented x3 speech is soft and normal in caliber and epifanio she maintains eye contact no psychomotor agitation noted      DATA REVIEWED:  Additional History from Old Records Summarized (2): 0  Decision to Obtain Records (1): 0  Radiology Tests Summarized or Ordered (1): 0  Labs Reviewed or Ordered (1): 0  Medicine Test Summarized or Ordered (1): 0  Independent Review of EKG or X-RAY(2 each): 0    The visit lasted a total of 30 minutes.    MEDICATIONS:  Current Outpatient Medications   Medication Sig Dispense Refill     cloNIDine (CATAPRES) 0.1 MG tablet        FLUoxetine (PROZAC) 40 MG capsule        FLUoxetine HCl (PROZAC PO) Take by mouth daily       levonorgestrel (MIRENA) 20 MCG/24HR IUD 1 each by Intrauterine route once       mirtazapine (REMERON) 30 MG tablet        Multiple Vitamin (DAILY-PERCY MULTIVITAMIN) TABS        OLANZapine (ZYPREXA) 10 MG tablet        QUEtiapine (SEROQUEL) 100 MG tablet        QUEtiapine Fumarate (SEROQUEL PO)        SUBOXONE 8-2 MG per film Take 2 tabs am       traZODone (DESYREL) 100 MG tablet        albuterol (PROAIR HFA/PROVENTIL HFA/VENTOLIN HFA) 108 (90 Base) MCG/ACT inhaler Inhale 2 puffs into the lungs 4 times daily for 7 days 8.5 g 0     cetirizine (ZYRTEC) 10 MG tablet Take 1 tablet (10 mg) by mouth daily (Patient not taking: Reported on 5/1/2019) 30 tablet 1     lithium 300 MG capsule Pt takes 300mg in am and 600mg in evening (Patient not taking: Reported on 5/1/2019) 90 capsule 0     NITROFURANTOIN PO  (Patient not taking: Reported on 9/21/2021)       OXcarbazepine (TRILEPTAL PO)  (Patient not taking: Reported on 9/21/2021)       RISPERIDONE PO Take by mouth daily (Patient not taking: Reported on 9/21/2021)       Skin Protectants, Misc. (EUCERIN) cream Apply topically as needed for dry skin (Patient not taking: Reported on 6/5/2019) 454 g 1     triamcinolone (KENALOG) 0.1 % external cream Apply  topically 2 times daily (Patient not taking: Reported on 9/21/2021) 45 g 0     triamcinolone (KENALOG) 0.1 % external cream Apply topically 3 times daily (Patient not taking: Reported on 6/5/2019) 80 g 1     Varenicline Tartrate (CHANTIX PO)  (Patient not taking: Reported on 9/21/2021)

## 2021-09-21 NOTE — PATIENT INSTRUCTIONS
We have ordered some tests for you today      The test results will be made available tomorrow I will call you with those test results.

## 2021-09-22 ENCOUNTER — TELEPHONE (OUTPATIENT)
Dept: FAMILY MEDICINE | Facility: CLINIC | Age: 36
End: 2021-09-22

## 2021-09-22 LAB
ERYTHROCYTE [DISTWIDTH] IN BLOOD BY AUTOMATED COUNT: 16.3 % (ref 10–15)
HCT VFR BLD AUTO: 28 % (ref 35–47)
HGB BLD-MCNC: 8.9 G/DL (ref 11.7–15.7)
MCH RBC QN AUTO: 25.9 PG (ref 26.5–33)
MCHC RBC AUTO-ENTMCNC: 31.8 G/DL (ref 31.5–36.5)
MCV RBC AUTO: 81 FL (ref 78–100)
PLATELET # BLD AUTO: 252 10E3/UL (ref 150–450)
RBC # BLD AUTO: 3.44 10E6/UL (ref 3.8–5.2)
WBC # BLD AUTO: 4.4 10E3/UL (ref 4–11)

## 2021-09-22 RX ORDER — CIPROFLOXACIN 500 MG/1
500 TABLET, FILM COATED ORAL 2 TIMES DAILY
Qty: 20 TABLET | Refills: 0 | Status: SHIPPED | OUTPATIENT
Start: 2021-09-22 | End: 2021-10-02

## 2021-09-22 NOTE — TELEPHONE ENCOUNTER
Patient called back, answered questions about anemia and discussed ways to increase her hemoglobin.     Patient can not take Bactrim because it's given her pancreatitis in the past, so she is requesting a different antibiotic to treat her bladder infection.     She uses [facility] on [address]. She is also requesting an iron supplement to be prescribed to help with the anemia if possible.    Luann REYES RN

## 2021-09-22 NOTE — RESULT ENCOUNTER NOTE
Please let the patient know that the urine test shows that she has a bacterial infection or bladder infection and I will treat her with antibiotics for that.  Her tests for her liver function and pancreas are normal she does not have pancreatitis however she is anemic.  Has she been experiencing heavy periods?  She should follow-up in clinic for further evaluation after her bladder infection has resolved

## 2021-09-22 NOTE — TELEPHONE ENCOUNTER
Neeraj Mock MD   9/22/2021  7:44 AM CDT Back to Top        Please let the patient know that the urine test shows that she has a bacterial infection or bladder infection and I will treat her with antibiotics for that.  Her tests for her liver function and pancreas are normal she does not have pancreatitis however she is anemic.  Has she been experiencing heavy periods?  She should follow-up in clinic for further evaluation after her bladder infection has resolved     Called patient to notify of provider message below, left voicemail for patient to call clinic back at earliest convenience.     Luann REYES RN

## 2021-09-23 LAB — BACTERIA UR CULT: ABNORMAL

## 2022-02-24 ENCOUNTER — OFFICE VISIT (OUTPATIENT)
Dept: FAMILY MEDICINE | Facility: CLINIC | Age: 37
End: 2022-02-24
Payer: COMMERCIAL

## 2022-02-24 VITALS
SYSTOLIC BLOOD PRESSURE: 106 MMHG | BODY MASS INDEX: 25.56 KG/M2 | WEIGHT: 149 LBS | DIASTOLIC BLOOD PRESSURE: 73 MMHG | HEART RATE: 75 BPM | RESPIRATION RATE: 16 BRPM

## 2022-02-24 DIAGNOSIS — F50.83 PICA IN ADULTS: Primary | ICD-10-CM

## 2022-02-24 LAB
ALBUMIN UR-MCNC: NEGATIVE MG/DL
ANION GAP SERPL CALCULATED.3IONS-SCNC: 13 MMOL/L (ref 5–18)
APPEARANCE UR: CLEAR
BACTERIA #/AREA URNS HPF: ABNORMAL /HPF
BILIRUB UR QL STRIP: NEGATIVE
BUN SERPL-MCNC: 7 MG/DL (ref 8–22)
CALCIUM SERPL-MCNC: 9.5 MG/DL (ref 8.5–10.5)
CHLORIDE BLD-SCNC: 101 MMOL/L (ref 98–107)
CO2 SERPL-SCNC: 23 MMOL/L (ref 22–31)
COLOR UR AUTO: YELLOW
CREAT SERPL-MCNC: 0.69 MG/DL (ref 0.6–1.1)
ERYTHROCYTE [DISTWIDTH] IN BLOOD BY AUTOMATED COUNT: 17.4 % (ref 10–15)
FERRITIN SERPL-MCNC: 10 NG/ML (ref 10–130)
GFR SERPL CREATININE-BSD FRML MDRD: >90 ML/MIN/1.73M2
GLUCOSE BLD-MCNC: 92 MG/DL (ref 70–125)
GLUCOSE UR STRIP-MCNC: NEGATIVE MG/DL
HBA1C MFR BLD: 5.3 % (ref 0–5.6)
HCT VFR BLD AUTO: 37 % (ref 35–47)
HGB BLD-MCNC: 11.8 G/DL (ref 11.7–15.7)
HGB UR QL STRIP: NEGATIVE
IRON SATN MFR SERPL: 8 % (ref 15–46)
IRON SERPL-MCNC: 37 UG/DL (ref 35–180)
KETONES UR STRIP-MCNC: NEGATIVE MG/DL
LEUKOCYTE ESTERASE UR QL STRIP: NEGATIVE
MCH RBC QN AUTO: 25.5 PG (ref 26.5–33)
MCHC RBC AUTO-ENTMCNC: 31.9 G/DL (ref 31.5–36.5)
MCV RBC AUTO: 80 FL (ref 78–100)
NITRATE UR QL: NEGATIVE
OSMOLALITY UR: 297 MOSM/KG (ref 300–900)
PH UR STRIP: 6 [PH] (ref 5–8)
PLATELET # BLD AUTO: 331 10E3/UL (ref 150–450)
POTASSIUM BLD-SCNC: 3.9 MMOL/L (ref 3.5–5)
RBC # BLD AUTO: 4.63 10E6/UL (ref 3.8–5.2)
RBC #/AREA URNS AUTO: ABNORMAL /HPF
SODIUM SERPL-SCNC: 137 MMOL/L (ref 136–145)
SP GR UR STRIP: 1.01 (ref 1–1.03)
SQUAMOUS #/AREA URNS AUTO: ABNORMAL /LPF
TIBC SERPL-MCNC: 471 UG/DL (ref 240–430)
UROBILINOGEN UR STRIP-ACNC: 0.2 E.U./DL
WBC # BLD AUTO: 4.7 10E3/UL (ref 4–11)
WBC #/AREA URNS AUTO: ABNORMAL /HPF

## 2022-02-24 PROCEDURE — 36415 COLL VENOUS BLD VENIPUNCTURE: CPT | Performed by: FAMILY MEDICINE

## 2022-02-24 PROCEDURE — 83036 HEMOGLOBIN GLYCOSYLATED A1C: CPT | Performed by: FAMILY MEDICINE

## 2022-02-24 PROCEDURE — 83550 IRON BINDING TEST: CPT | Performed by: FAMILY MEDICINE

## 2022-02-24 PROCEDURE — 83935 ASSAY OF URINE OSMOLALITY: CPT | Performed by: FAMILY MEDICINE

## 2022-02-24 PROCEDURE — 99214 OFFICE O/P EST MOD 30 MIN: CPT | Performed by: FAMILY MEDICINE

## 2022-02-24 PROCEDURE — 82728 ASSAY OF FERRITIN: CPT | Performed by: FAMILY MEDICINE

## 2022-02-24 PROCEDURE — 85027 COMPLETE CBC AUTOMATED: CPT | Performed by: FAMILY MEDICINE

## 2022-02-24 PROCEDURE — 81001 URINALYSIS AUTO W/SCOPE: CPT | Performed by: FAMILY MEDICINE

## 2022-02-24 PROCEDURE — 80048 BASIC METABOLIC PNL TOTAL CA: CPT | Performed by: FAMILY MEDICINE

## 2022-02-24 NOTE — PROGRESS NOTES
Assessment & Plan     Concern of urine dilution on court ordered testing.    Patient has been advised to seek medical care to determine if there was a medical or medication reason for her urine appearing dilute.     I do not see any evidence of diabetes mellitus or insipidus. She is not taking medications that should decrease urine osmolality with the exception of the consideration that fluoxetine and/or olanzapine may cause dry mouth and encouraged her to drink more, which would in turn dilute her urine.    She has a very mild decrease in urine osmolality on the testing that was done today, but not a high clinical suggestion off polyuria. If polyuria were present it would be suggestive of the diagnosis of primary polydipsia or diabetes insipidus.    My recommendations currently will be for her to decrease her fluid intake prior to voiding her urine sample for the court ordered testing. Unfortunately this may mean that she needs to delay her morning coffee until after producing the sample.    If this continues to be a legal problem for her, I will recommend that she complete a 24-hour urine collection to evaluate for polyuria, and that she sees endocrinology for her thorough evaluation of potential for diabetes insipidus or primary polydipsia.    (F50.89) Pica in adults  Comment: Potential differentials include diabetes mellitus and diabetes insipidus. Side effects and adverse reactions were reviewed for her medications, none of which include dilute urine. Labs all unremarkable. Urine for sample was yellow and osmolality was normal. Concern for anemia due to craving ice but CBC and Ferritin normal; will await results of total iron binding capacity test.   Plan: Hemoglobin A1c (aka HBA1C), CBC with platelets,        Basic metabolic panel  (Ca, Cl, CO2, Creat,         Gluc, K, Na, BUN), UA with Microscopic - lab         collect, Osmolality urine, Ferritin, Iron and         iron binding capacity, Urine Microscopic  Exam          Patient declined Pap test today. She signed RHONDA to transfer those test results to [facility] as it was done about 8 months ago at [facility].    Brooke Reyes, Student NP    Zi Phoenix MD  [facility]    Lianet Carmichael is a 36 year old who presents today for medical evaluation for dilute urine. She is undergoing court ordered random urine testing. There has been five times where her urine is too dilute by the court laboratory standards and she is penalized for it. The court has asked that she be seen to evaluate potential causes for dilute urine I.e. medications, disease processes, etc.     Reviewed typical daily I&O. Patient's normal morning routine is to wake up, call at 6am to determine if she will be tested that day, drink two cups of coffee, and take her test at 9am. In the past when she drank water before her test, the results have been dilute. She gets both normal and dilute urine results with her typical morning coffee intake.     She drinks coffee throughout the day due to fatigue, one big glass of water throughout the day, and chews ice throughout the day- all after her UA. Denies polyuria- she thinks maybe more than the average person but not excessive.    Last urine output before urinalysis was this morning at 9am. UA done during visit.    Patient Active Problem List   Diagnosis    Alcohol use disorder, severe, dependence (H)    Anxiety state    Bipolar 1 disorder, depressed (H)    Bipolar 1 disorder, mixed, severe (H)    Bipolar disorder with severe cb (H)    Cannabis dependence (H)    Drug-induced mood disorder (H)    Eating disorder    Intentional lithium overdose (H)    Opioid use disorder, severe, dependence (H)    Other known or suspected fetal abnormality, not elsewhere classified, affecting management of mother, antepartum condition or complication    PTSD (post-traumatic stress disorder)    Supervision of high-risk pregnancy     Current  Outpatient Medications   Medication    FLUoxetine (PROZAC) 40 MG capsule    levonorgestrel (MIRENA) 20 MCG/24HR IUD    OLANZapine (ZYPREXA) 10 MG tablet    SUBOXONE 8-2 MG per film     No current facility-administered medications for this visit.       Review of Systems   GEN: no fevers or chills    RESP: no cough or wheezing   CV: no dyspnea, palpitations, edema or chest pain   : normal urination, history of UTI's but no sx today  ENDO: no hot or cold intolerance, no polydipsia or polyuria         Objective    /73 (BP Location: Right arm, Patient Position: Sitting, Cuff Size: Adult Regular)   Pulse 75   Resp 16   Wt 67.6 kg (149 lb)   BMI 25.56 kg/m    Body mass index is 25.56 kg/m .  Physical Exam   GENERAL: healthy, alert and no distress  RESP: lungs clear to auscultation - no rales, rhonchi or wheezes  CV: regular rate and rhythm, normal S1 S2, no S3 or S4, no murmur, click or rub, no peripheral edema and peripheral pulses strong  ABDOMEN: soft, nontender, no hepatosplenomegaly, no masses and bowel sounds normal  NEURO: Normal strength and tone, mentation intact and speech normal  PSYCH: mentation appears normal, affect normal/bright    Recent Results (from the past 24 hour(s))   Hemoglobin A1c (aka HBA1C)    Collection Time: 02/24/22 10:32 AM   Result Value Ref Range    Hemoglobin A1C 5.3 0.0 - 5.6 %   CBC with platelets    Collection Time: 02/24/22 10:32 AM   Result Value Ref Range    WBC Count 4.7 4.0 - 11.0 10e3/uL    RBC Count 4.63 3.80 - 5.20 10e6/uL    Hemoglobin 11.8 11.7 - 15.7 g/dL    Hematocrit 37.0 35.0 - 47.0 %    MCV 80 78 - 100 fL    MCH 25.5 (L) 26.5 - 33.0 pg    MCHC 31.9 31.5 - 36.5 g/dL    RDW 17.4 (H) 10.0 - 15.0 %    Platelet Count 331 150 - 450 10e3/uL   Basic metabolic panel  (Ca, Cl, CO2, Creat, Gluc, K, Na, BUN)    Collection Time: 02/24/22 10:32 AM   Result Value Ref Range    Sodium 137 136 - 145 mmol/L    Potassium 3.9 3.5 - 5.0 mmol/L    Chloride 101 98 - 107 mmol/L     Carbon Dioxide (CO2) 23 22 - 31 mmol/L    Anion Gap 13 5 - 18 mmol/L    Urea Nitrogen 7 (L) 8 - 22 mg/dL    Creatinine 0.69 0.60 - 1.10 mg/dL    Calcium 9.5 8.5 - 10.5 mg/dL    Glucose 92 70 - 125 mg/dL    GFR Estimate >90 >60 mL/min/1.73m2   Ferritin    Collection Time: 02/24/22 10:32 AM   Result Value Ref Range    Ferritin 10 10 - 130 ng/mL   UA with Microscopic - lab collect    Collection Time: 02/24/22 10:41 AM   Result Value Ref Range    Color Urine Yellow Colorless, Straw, Light Yellow, Yellow    Appearance Urine Clear Clear    Glucose Urine Negative Negative mg/dL    Bilirubin Urine Negative Negative    Ketones Urine Negative Negative mg/dL    Specific Gravity Urine 1.015 1.005 - 1.030    Blood Urine Negative Negative    pH Urine 6.0 5.0 - 8.0    Protein Albumin Urine Negative Negative mg/dL    Urobilinogen Urine 0.2 0.2, 1.0 E.U./dL    Nitrite Urine Negative Negative    Leukocyte Esterase Urine Negative Negative   Osmolality urine    Collection Time: 02/24/22 10:41 AM   Result Value Ref Range    Osmolality Urine 297 (L) 300 - 900 mOsm/kg   Urine Microscopic Exam    Collection Time: 02/24/22 10:41 AM   Result Value Ref Range    Bacteria Urine Few (A) None Seen /HPF    RBC Urine None Seen 0-2 /HPF /HPF    WBC Urine 0-5 0-5 /HPF /HPF    Squamous Epithelials Urine Few (A) None Seen /LPF

## 2022-10-23 ENCOUNTER — HEALTH MAINTENANCE LETTER (OUTPATIENT)
Age: 37
End: 2022-10-23

## 2022-12-21 ENCOUNTER — HOSPITAL ENCOUNTER (OUTPATIENT)
Facility: CLINIC | Age: 37
Setting detail: OBSERVATION
Discharge: NURSING, REHAB OR LONG-TERM CARE | End: 2022-12-24
Attending: EMERGENCY MEDICINE | Admitting: EMERGENCY MEDICINE
Payer: COMMERCIAL

## 2022-12-21 DIAGNOSIS — F10.20 ALCOHOL USE DISORDER, SEVERE, DEPENDENCE (H): ICD-10-CM

## 2022-12-21 DIAGNOSIS — F43.10 PTSD (POST-TRAUMATIC STRESS DISORDER): ICD-10-CM

## 2022-12-21 DIAGNOSIS — F15.20 SEVERE STIMULANT USE DISORDER (H): ICD-10-CM

## 2022-12-21 DIAGNOSIS — F31.9 BIPOLAR 1 DISORDER, DEPRESSED (H): ICD-10-CM

## 2022-12-21 LAB — SARS-COV-2 RNA RESP QL NAA+PROBE: NEGATIVE

## 2022-12-21 PROCEDURE — G0378 HOSPITAL OBSERVATION PER HR: HCPCS

## 2022-12-21 PROCEDURE — 90791 PSYCH DIAGNOSTIC EVALUATION: CPT

## 2022-12-21 PROCEDURE — U0005 INFEC AGEN DETEC AMPLI PROBE: HCPCS | Performed by: EMERGENCY MEDICINE

## 2022-12-21 PROCEDURE — 250N000013 HC RX MED GY IP 250 OP 250 PS 637: Performed by: EMERGENCY MEDICINE

## 2022-12-21 PROCEDURE — C9803 HOPD COVID-19 SPEC COLLECT: HCPCS

## 2022-12-21 PROCEDURE — 99285 EMERGENCY DEPT VISIT HI MDM: CPT | Mod: 25

## 2022-12-21 RX ORDER — HYDROXYZINE HYDROCHLORIDE 50 MG/1
50 TABLET, FILM COATED ORAL EVERY 6 HOURS PRN
Status: DISCONTINUED | OUTPATIENT
Start: 2022-12-21 | End: 2022-12-24 | Stop reason: HOSPADM

## 2022-12-21 RX ORDER — GABAPENTIN 300 MG/1
300 CAPSULE ORAL 2 TIMES DAILY
COMMUNITY
Start: 2022-12-13 | End: 2022-12-23

## 2022-12-21 RX ORDER — LORAZEPAM 2 MG/1
2 TABLET ORAL ONCE
Status: COMPLETED | OUTPATIENT
Start: 2022-12-21 | End: 2022-12-21

## 2022-12-21 RX ORDER — LISDEXAMFETAMINE DIMESYLATE 50 MG
50 CAPSULE ORAL EVERY MORNING
COMMUNITY
Start: 2022-12-15 | End: 2022-12-22

## 2022-12-21 RX ORDER — LANOLIN ALCOHOL/MO/W.PET/CERES
3 CREAM (GRAM) TOPICAL
Status: DISCONTINUED | OUTPATIENT
Start: 2022-12-21 | End: 2022-12-22

## 2022-12-21 RX ORDER — IBUPROFEN 600 MG/1
600 TABLET, FILM COATED ORAL EVERY 6 HOURS PRN
Status: DISCONTINUED | OUTPATIENT
Start: 2022-12-21 | End: 2022-12-24 | Stop reason: HOSPADM

## 2022-12-21 RX ORDER — OLANZAPINE 5 MG/1
5 TABLET, ORALLY DISINTEGRATING ORAL 3 TIMES DAILY PRN
Status: DISCONTINUED | OUTPATIENT
Start: 2022-12-21 | End: 2022-12-24 | Stop reason: HOSPADM

## 2022-12-21 RX ORDER — OLANZAPINE 10 MG/2ML
10 INJECTION, POWDER, FOR SOLUTION INTRAMUSCULAR 2 TIMES DAILY PRN
Status: DISCONTINUED | OUTPATIENT
Start: 2022-12-21 | End: 2022-12-24 | Stop reason: HOSPADM

## 2022-12-21 RX ORDER — CARIPRAZINE 4.5 MG/1
4.5 CAPSULE, GELATIN COATED ORAL DAILY
COMMUNITY
Start: 2022-12-10 | End: 2022-12-23

## 2022-12-21 RX ORDER — TRAZODONE HYDROCHLORIDE 50 MG/1
50 TABLET, FILM COATED ORAL
Status: DISCONTINUED | OUTPATIENT
Start: 2022-12-21 | End: 2022-12-24 | Stop reason: HOSPADM

## 2022-12-21 RX ORDER — ACETAMINOPHEN 325 MG/1
650 TABLET ORAL EVERY 4 HOURS PRN
Status: DISCONTINUED | OUTPATIENT
Start: 2022-12-21 | End: 2022-12-24 | Stop reason: HOSPADM

## 2022-12-21 RX ADMIN — LORAZEPAM 2 MG: 2 TABLET ORAL at 20:53

## 2022-12-21 ASSESSMENT — ACTIVITIES OF DAILY LIVING (ADL)
ADLS_ACUITY_SCORE: 35
ADLS_ACUITY_SCORE: 35

## 2022-12-22 PROBLEM — F15.20 SEVERE STIMULANT USE DISORDER (H): Status: ACTIVE | Noted: 2022-12-22

## 2022-12-22 PROCEDURE — G0378 HOSPITAL OBSERVATION PER HR: HCPCS

## 2022-12-22 PROCEDURE — 250N000013 HC RX MED GY IP 250 OP 250 PS 637: Performed by: PSYCHIATRY & NEUROLOGY

## 2022-12-22 PROCEDURE — 90791 PSYCH DIAGNOSTIC EVALUATION: CPT

## 2022-12-22 PROCEDURE — 99220 PR INITIAL OBSERVATION CARE,LEVEL III: CPT | Performed by: PSYCHIATRY & NEUROLOGY

## 2022-12-22 RX ORDER — LORAZEPAM 1 MG/1
1-2 TABLET ORAL EVERY 30 MIN PRN
Status: DISCONTINUED | OUTPATIENT
Start: 2022-12-22 | End: 2022-12-23

## 2022-12-22 RX ORDER — GABAPENTIN 300 MG/1
300 CAPSULE ORAL 2 TIMES DAILY
Status: DISCONTINUED | OUTPATIENT
Start: 2022-12-22 | End: 2022-12-24 | Stop reason: HOSPADM

## 2022-12-22 RX ORDER — MULTIPLE VITAMINS W/ MINERALS TAB 9MG-400MCG
1 TAB ORAL DAILY
Status: DISCONTINUED | OUTPATIENT
Start: 2022-12-22 | End: 2022-12-24 | Stop reason: HOSPADM

## 2022-12-22 RX ORDER — LORAZEPAM 2 MG/ML
1-2 INJECTION INTRAMUSCULAR EVERY 30 MIN PRN
Status: DISCONTINUED | OUTPATIENT
Start: 2022-12-22 | End: 2022-12-23

## 2022-12-22 RX ADMIN — HYDROXYZINE HYDROCHLORIDE 50 MG: 50 TABLET, FILM COATED ORAL at 16:23

## 2022-12-22 RX ADMIN — FLUOXETINE HYDROCHLORIDE 20 MG: 20 CAPSULE ORAL at 14:55

## 2022-12-22 RX ADMIN — GABAPENTIN 300 MG: 300 CAPSULE ORAL at 19:07

## 2022-12-22 RX ADMIN — LORAZEPAM 1 MG: 1 TABLET ORAL at 15:01

## 2022-12-22 RX ADMIN — CARIPRAZINE 4.5 MG: 4.5 CAPSULE, GELATIN COATED ORAL at 14:54

## 2022-12-22 RX ADMIN — MULTIPLE VITAMINS W/ MINERALS TAB 1 TABLET: TAB at 14:55

## 2022-12-22 ASSESSMENT — ACTIVITIES OF DAILY LIVING (ADL)
ADLS_ACUITY_SCORE: 35

## 2022-12-22 ASSESSMENT — COLUMBIA-SUICIDE SEVERITY RATING SCALE - C-SSRS
TOTAL  NUMBER OF PREPARATORY ACTS PAST 3 MONTHS: 2
TOTAL  NUMBER OF ABORTED OR SELF INTERRUPTED ATTEMPTS PAST 3 MONTHS: NO
LETHALITY/MEDICAL DAMAGE CODE MOST LETHAL ACTUAL ATTEMPT: SEVERE PHYSICAL DAMAGE, MEDICAL HOSPITALIZATION WITH INTENSIVE CARE REQUIRED
2. HAVE YOU ACTUALLY HAD ANY THOUGHTS OF KILLING YOURSELF?: YES
TOTAL  NUMBER OF ABORTED OR SELF INTERRUPTED ATTEMPTS LIFETIME: 1
5. HAVE YOU STARTED TO WORK OUT OR WORKED OUT THE DETAILS OF HOW TO KILL YOURSELF? DO YOU INTEND TO CARRY OUT THIS PLAN?: YES
4. HAVE YOU HAD THESE THOUGHTS AND HAD SOME INTENTION OF ACTING ON THEM?: YES
TOTAL  NUMBER OF INTERRUPTED ATTEMPTS LIFETIME: YES
TOTAL  NUMBER OF ABORTED OR SELF INTERRUPTED ATTEMPTS LIFETIME: YES
LETHALITY/MEDICAL DAMAGE CODE MOST LETHAL POTENTIAL ATTEMPT: BEHAVIOR LIKELY TO RESULT IN DEATH DESPITE AVAILABLE MEDICAL CARE
1. HAVE YOU WISHED YOU WERE DEAD OR WISHED YOU COULD GO TO SLEEP AND NOT WAKE UP?: YES
LETHALITY/MEDICAL DAMAGE CODE FIRST ACTUAL ATTEMPT: MODERATELY SEVERE PHYSICAL DAMAGE, MEDICAL HOSPITALIZATION AND LIKELY INTENSIVE CARE REQUIRED
1. IN THE PAST MONTH, HAVE YOU WISHED YOU WERE DEAD OR WISHED YOU COULD GO TO SLEEP AND NOT WAKE UP?: YES
LETHALITY/MEDICAL DAMAGE CODE MOST RECENT POTENTIAL ATTEMPT: BEHAVIOR LIKELY TO RESULT IN DEATH DESPITE AVAILABLE MEDICAL CARE
REASONS FOR IDEATION PAST MONTH: EQUALLY TO GET ATTENTION, REVENGE, OR A REACTION FROM OTHERS AND TO END/STOP THE PAIN
ATTEMPT LIFETIME: YES
5. HAVE YOU STARTED TO WORK OUT OR WORKED OUT THE DETAILS OF HOW TO KILL YOURSELF? DO YOU INTEND TO CARRY OUT THIS PLAN?: YES
6. HAVE YOU EVER DONE ANYTHING, STARTED TO DO ANYTHING, OR PREPARED TO DO ANYTHING TO END YOUR LIFE?: YES
TOTAL  NUMBER OF INTERRUPTED ATTEMPTS PAST 3 MONTHS: NO
ATTEMPT PAST THREE MONTHS: NO
REASONS FOR IDEATION LIFETIME: EQUALLY TO GET ATTENTION, REVENGE, OR A REACTION FROM OTHERS AND TO END/STOP THE PAIN
LETHALITY/MEDICAL DAMAGE CODE FIRST POTENTIAL ATTEMPT: BEHAVIOR LIKELY TO RESULT IN DEATH DESPITE AVAILABLE MEDICAL CARE
2. HAVE YOU ACTUALLY HAD ANY THOUGHTS OF KILLING YOURSELF?: YES
6. HAVE YOU EVER DONE ANYTHING, STARTED TO DO ANYTHING, OR PREPARED TO DO ANYTHING TO END YOUR LIFE?: YES
TOTAL  NUMBER OF ACTUAL ATTEMPTS LIFETIME: 3
3. HAVE YOU BEEN THINKING ABOUT HOW YOU MIGHT KILL YOURSELF?: YES
4. HAVE YOU HAD THESE THOUGHTS AND HAD SOME INTENTION OF ACTING ON THEM?: YES
LETHALITY/MEDICAL DAMAGE CODE MOST RECENT ACTUAL ATTEMPT: SEVERE PHYSICAL DAMAGE, MEDICAL HOSPITALIZATION WITH INTENSIVE CARE REQUIRED

## 2022-12-22 NOTE — ED PROVIDER NOTES
History   Chief Complaint:  Manic Behavior and Suicidal       HPI   Amol Corado is a 37 year old female with history of polysubstance abuse and bipolar disorder who presents with a mental health evaluation. She told by her  to come in today. She has been abusing her Vyvanse and Xanax and also using meth. She felt like she was overdosing on meth today. She wanted to hurt herself when overdosing on her medications. She had a plan of jumping off a bridge yesterday. She is willing to get help.     Review of Systems   Psychiatric/Behavioral: Positive for suicidal ideas.   All other systems reviewed and are negative.      Allergies:  Bactrim [Sulfamethoxazole W/Trimethoprim]  Mirtazapine  Nuts    Medications:  Prozac  Vyvanse  Xanax  Zyprexa  Mirena    Past Medical History:     Substance abuse  Anxiety  Bipolar disorder  Eating disorder  Intentional overdose  Opioid abuse  PTSD    Past Surgical History:    Cervical biopsy    Family History:    Asthma  Colon cancer  Alcoholism  Depression  Stroke  Heart disease  DM    Social History:  The patient presents to the ED alone  PCP: [facility]     Physical Exam     Patient Vitals for the past 24 hrs:   BP Temp Temp src Pulse Resp SpO2   12/21/22 2005 122/65 97.8  F (36.6  C) Temporal 113 16 99 %       Physical Exam  Vitals: reviewed by me  General: Pt seen on [facility], pleasant, cooperative, and alert to conversation, somewhat agitated and anxious.  Eyes: Tracking well, clear conjunctiva BL  ENT: MMM, midline trachea.   Lungs: No tachypnea, no accessory muscle use. No respiratory distress.   MSK: no joint effusion.  No evidence of trauma  Skin: No rash  Neuro: Clear speech and no facial droop.  Psych: Not RIS, no e/o AH/VH, endorses suicidal ideation.      Emergency Department Course     Laboratory:  Labs Ordered and Resulted from Time of ED Arrival to Time of ED Departure   COVID-19 VIRUS (CORONAVIRUS) BY PCR - Normal        Result Value    SARS CoV2 PCR Negative          Emergency Department Course:    Reviewed:  I reviewed nursing notes, vitals, past medical history and Care Everywhere    Assessments:  2050 I obtained history and examined the patient as noted above.     Interventions:  2053 Ativan 2 mg PO    Disposition:  The patient was transferred to [facility].     Impression & Plan       Medical Decision Making:  This is a very pleasant 37-year-old female who presents to the emergency room with what appears to be possible anneliese.  4 days ago she took all of her Vyvanse, and also has been doing meth since then.  No coingestions, denies any overdose attempts in the last 72 to 96 hours.  States that she simply feels like she is on meth right now, and would like to speak to a mental health associate.  Mentating appropriately, does seem somewhat anxious here, and is willing to receive oral Ativan.  We will plan for transfer to [facility] as I do think that this is the next best up in her management.      Diagnosis:    ICD-10-CM    1. Anneliese (H)  F30.9         Scribe Disclosure:  I, Abigail Loera, am serving as a scribe at 8:47 PM on 12/21/2022 to document services personally performed by Gaurang Lu MD based on my observations and the provider's statements to me.            Gaurang Lu MD  12/21/22 0049

## 2022-12-22 NOTE — ED NOTES
Pt states court ordered her to be here and put out warrant if she didn't come here    Pt also kicked out of sober living

## 2022-12-22 NOTE — ED NOTES
"Pt. Reports that she feels \"terrible\" physically and emotionally. Body aches. Continued hopelessness with SI. Did eat breakfast. Does not feel \"ready\" to complete AM ADL's  at this time. Resting in recliner. Seen by [facility]. Awaiting psychiatry to see.   "

## 2022-12-22 NOTE — ED PROVIDER NOTES
EmPATH Unit - Initial Psychiatric Observation Note  [facility] Emergency Department  Observation Initiation Date: Dec 21, 2022    Amol Corado MRN: [id number]   Age: 37 year old YOB: 1985     History     Chief Complaint   Patient presents with     Manic Behavior     Suicidal     HPI  Amol Corado is a 37 year old female with a past history notable for bipolar 1 disorder and substance use disorders involving methamphetamine, alcohol, and opiates.  She presented to the emergency department for evaluation of depressed mood and suicidal thoughts in the context of a recent relapse on methamphetamines and alcohol.  She was determined to be medically stable and transferred to the EmPATH unit for psychiatric assessment.  On examination, she recalls that she recently completed residential treatment and transition to a sober home.  She interprets her mental health symptoms as being fairly stable during that time while taking a combination of Vraylar and Prozac.  Shortly after transitioning to the sober home, she and her fiancé had a verbal conflict which led to them canceling their wedding plans in February.  As she was emotionally distraught, she resorted to illicit substances to numb the emotional intensity.  She recalls using a variety of stimulants, including intravenous use, heavy amounts of alcohol, and a few benzodiazepines.  She suspects that she will withdrawal from stimulants and possibly alcohol.  No history of withdrawal seizures or DTs.  Suicidal thoughts have subsided today and she is no longer reporting active suicidal thoughts while in a supported environment.  There was no indication of psychosis or homicidal thoughts.    Past Medical History  Past Medical History:   Diagnosis Date     Bipolar 1 disorder (H)      Past Surgical History:   Procedure Laterality Date     BIOPSY CERVICAL, LOCAL EXCISION, SINGLE/MULTIPLE  2005     FLUoxetine (PROZAC) 20 MG capsule  FLUoxetine (PROZAC) 40 MG  capsule  gabapentin (NEURONTIN) 300 MG capsule  levonorgestrel (MIRENA) 20 MCG/24HR IUD  VRAYLAR 4.5 MG capsule      Allergies   Allergen Reactions     Bactrim [Sulfamethoxazole W/Trimethoprim] Other (See Comments)     pancreatitis     Mirtazapine      Weight gain, polyphagia, not feeling well in general     Nuts Unknown     Family History  Family History   Problem Relation Age of Onset     Asthma Mother      Colon Cancer Mother 45.00        part of colon removed     Alcoholism Father      Depression Father      Early Death Father         following stroke     Heart Disease Father      Mental Illness Father      Prostate Cancer Father      Cerebrovascular Disease Father      Asthma Son      Diabetes Maternal Aunt      Cancer Maternal Grandmother      Breast Cancer Paternal Grandmother         remission     Cerebrovascular Disease Paternal Grandmother      Heart Disease Paternal Grandfather      Cerebrovascular Disease Paternal Grandfather      Social History   Social History     Tobacco Use     Smoking status: Every Day     Packs/day: 0.25     Years: 15.00     Pack years: 3.75     Types: Cigarettes     Start date: 11/6/1998     Smokeless tobacco: Former     Tobacco comments:     Reports smoking e-cigarettes daily.   Substance Use Topics     Alcohol use: Not Currently     Alcohol/week: 0.0 standard drinks     Comment: Alcoholic Drinks/day: Relpased after 3 years sober 1/5/2017. a bottle every day     Drug use: Not Currently     Types: Marijuana, Heroin, Cocaine     Comment: Drug use: states no THC, no heroin, last used [id number]/22/2019      Past medical history, past surgical history, medications, allergies, family history, and social history were reviewed with the patient. No additional pertinent items.       Review of Systems  A complete review of systems was performed with pertinent positives and negatives noted in the HPI, and all other systems negative.    Physical Examination   BP: 122/65  Pulse: 113  Temp:  "97.8  F (36.6  C)  Resp: 16  Height: 162.6 cm (5' 4\")  Weight: 56.5 kg (124 lb 9.6 oz)  SpO2: 99 %    Physical Exam  General: Appears stated age.   Neuro: Alert and fully oriented. Extremities appear to demonstrate normal strength on visual inspection.   Integumentary/Skin: no rash visualized, normal color    Psychiatric Examination   Appearance: awake, alert  Attitude:  cooperative  Eye Contact:  fair  Mood:  anxious and sad   Affect:  intensity is blunted  Speech:  clear, coherent  Psychomotor Behavior:  tremor observed   Thought Process:  linear  Associations:  no loose associations  Thought Content:  no evidence of suicidal ideation or homicidal ideation and no evidence of psychotic thought  Insight:  fair  Judgement:  fair  Oriented to:  time, person, and place  Attention Span and Concentration:  fair  Recent and Remote Memory:  fair  Language: able to name/identify objects without impairment  Fund of Knowledge: intact with awareness of current and past events    ED Course        Labs Ordered and Resulted from Time of ED Arrival to Time of ED Departure   COVID-19 VIRUS (CORONAVIRUS) BY PCR - Normal       Result Value    SARS CoV2 PCR Negative         Assessments & Plan (with Medical Decision Making)   Patient presenting with emotional dysregulation in the context of significant psychosocial stressors and a relapse on illicit substances and alcohol.  Suicidal thoughts have subsided and the patient is seeking to formulate a treatment plan that aims to maintain mental health stability and promote sobriety. Nursing notes reviewed noting no acute issues.     I have reviewed the assessment completed by the [facility].     During the observation period, the patient did not require medications for agitation, and did not require restraints/seclusion for patient and/or provider safety.     The patient was found to have a psychiatric condition that would benefit from an observation stay in the emergency department for further " psychiatric stabilization and/or coordination of a safe disposition. The observation plan includes serial assessments of psychiatric condition, potential administration of medications if indicated, further disposition pending the patient's psychiatric course during the monitoring period.     Preliminary diagnosis:    ICD-10-CM    1. Alcohol use disorder, severe, dependence (H)  F10.20       2. PTSD (post-traumatic stress disorder)  F43.10       3. Bipolar 1 disorder, depressed (H)  F31.9       4. Severe stimulant use disorder (H)  F15.20            Treatment Plan:  -Restart Prozac and Vraylar for mood stabilization of her bipolar disorder  -Restart gabapentin for reduction of anxiety  -[facility] protocol with Ativan to monitor and treat any emerging alcohol withdrawal symptoms  -Chemical health assessment to aid in exploring substance use disorder treatment interventions  -Enter to observation status and reassess tomorrow.    --  Urbano Bullock MD   [facility] EMERGENCY DEPT  EmPATH Unit  12/21/2022        Urbano Bullock MD  12/22/22 [id number]

## 2022-12-22 NOTE — ED NOTES
LMHP Note:      Patient unable to participate in crisis assessment due to current mental status. Per nursing staff, patient was sedated and had difficulty answering questions, likely due to fatigue and recent methamphetamine use. Patient has been sleeping through the night. Assessment to be completed when patient is awake.       CAROLA Howard on 12/22/2022 at 6:23 AM

## 2022-12-22 NOTE — ED NOTES
"The following information was received from Leia Cortez whose relationship to the patient is [facility] . Information was obtained via phone. Their phone number is # [phone] and they last had contact with patient on 12/21/2022.      How long have you been working with the patient: She shared she has been working with Pt for the past 1.5 years.      How often do you see them: She shared \"Wanda and I are on the same team. When inpatient, every 2-3 weeks. When not in inpatient services, it's weekly or every other. With Alanna, monthly.\" When discussing frequency regarding Alanna, she shared \"can't really speak how regularly\" Pt was acting attending the appointments.    What is the patient's legal status (Commitment, probation, guardian, etc.): She shared Pt is her own legal guardian.    How does their current level of functioning compare to baseline: She shared \"Amol she had been living at a sober house, prior to leaving the sober house, she had a 30 day supply of Vyvanse and used it all in 3 days.\" She shared Pt left the house and outpatient services and \"hasn't been in contact with either of the places.\" She shared Pt \"has been in contact with her .\" She shared Pt attempting to go to [facility] to be admitted yet was not. She shared being unsure if Pt actually attempted to go to [facility] or not.     She shared Pt made \"suicidal statements to girlfriend, including to jump off a bridge. Her girlfriend contacted [place] police, and when they arrived, she then fled. All of this has happened in the last couple of days. Amol has a history of use, methamphetamine, we don't know if she has used.\" Regarding mental health and substance use, she shared \"Amol has history of both, this all kind of spiraled out of control before\" possible substance use/abuse.    She shared when \"Amol has any relationship issues, she can't deal with it.\" She shared Pt will start \"abusing " "her medication, relapsing, threatening suicide.\" She shared suicidal ideation has been chronic for Pt, yet identifies this as an increase.      Concern about alcohol/drug use? Yes She shared Pt has a history of methamphetamine use.    Has the patient made any comments about wanting to kill themselves/others: Yes - She shared Pt made \"suicidal statements to girlfriend, including to jump off a bridge. Her girlfriend contacted [place] police, and when they arrived, she then fled.\" She shared history of suicidal ideation and attempts, identified as chronic, yet indicates there is an increase in suicidal ideation when Pt uses substances.     What is your treatment plan going forward: She shared Pt is not on a commitment right now, yet \"I don't think it would be a bad idea, to get Amol to commit to the help that she needs.\" She shared Pt has a history of \"several mental health programs, treatments, therapists, psychiatrists. Nothing lasts very long.\"     Other information:     Targeted  through [facility] - Alanna Kahn - [phone]    [facility]  - Leia Dana - [phone]     - Wanda Rodarte - [phone]    She shared Pt has a psychiatrist established while at [facility] treatment program in [place].    "

## 2022-12-22 NOTE — PLAN OF CARE
Amol Corado  December 22, 2022  Plan of Care Hand-off Note     Patient Care Path: Observation    Plan for Care:   A lower level of care has been unsuccessful in treating and stabilizing patient s mental health symptoms. Patient will remain on EmPATH unit under observation for continued monitoring, treatment and therapeutic intervention of mental health symptoms. Observation at [facility] could help mitigate the need for a more restrictive level of care in an inpatient setting.      Observation is recommended to reassess safety and to receive a dual MH/JULIO assessment to determine potential treatment options.      Critical Safety Issues: Suicidal ideation    Overview:  This patient is a child/adolescent: No    This patient has additional special visitor precautions: No    Legal Status: Voluntary    Contacts:     Targeted  through [facility] - [facility] - [phone]    [facility]  - Leia Dana - [phone]     - Wanda Rodarte - [phone]       Psychiatry Consult:  Access to psychiatry while on EmPATH.     Updated RN and Attending Provider regarding plan of care.    Libra Son

## 2022-12-22 NOTE — ED TRIAGE NOTES
"Pt reports PO and  wanted her to \"turn herself in\" pt report bipolar episodes and using meth last use earlier today. Pt abusing prescriptions vivance and adderal. Pt took whole bottle of this but two days ago     SI with a plan off jump off bridge pt also ran from police yesterday in her car as well     "
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
## 2022-12-22 NOTE — PROGRESS NOTES
Patient slept through the night without waking. No signs of distress. Plan for LMHP and provider assessment today.

## 2022-12-22 NOTE — PROGRESS NOTES
"Patient observed resting in recliner with blanket over head. Awakens easily to voice. Presents with flat affect, reports she feels \"like sh*t\". Scoring 7 on CIWA at this time, endorsing mild anxiety, tremor and diaphoresis. Denies nausea, vomiting, headache, hallucinations, confusion. Patient agrees to take hydroxyzine 50 mg PRN for mild anxiety. She ate 50% of meal, has been sipping on water and encouraged to increase fluids tonight. /71, . Her main concern at this time is rest. Provided with water, warm blanket. Nursing will continue to monitor.   "

## 2022-12-23 ENCOUNTER — HOSPITAL ENCOUNTER (OUTPATIENT)
Dept: BEHAVIORAL HEALTH | Facility: CLINIC | Age: 37
Discharge: HOME | End: 2022-12-23
Attending: FAMILY MEDICINE | Admitting: FAMILY MEDICINE
Payer: COMMERCIAL

## 2022-12-23 PROCEDURE — 90791 PSYCH DIAGNOSTIC EVALUATION: CPT | Mod: GT,95 | Performed by: COUNSELOR

## 2022-12-23 PROCEDURE — G0378 HOSPITAL OBSERVATION PER HR: HCPCS

## 2022-12-23 PROCEDURE — 99217 PR OBSERVATION CARE DISCHARGE: CPT | Performed by: PSYCHIATRY & NEUROLOGY

## 2022-12-23 PROCEDURE — 250N000013 HC RX MED GY IP 250 OP 250 PS 637: Performed by: PSYCHIATRY & NEUROLOGY

## 2022-12-23 RX ORDER — GABAPENTIN 300 MG/1
300 CAPSULE ORAL 2 TIMES DAILY
Qty: 60 CAPSULE | Refills: 0 | Status: SHIPPED | OUTPATIENT
Start: 2022-12-23 | End: 2023-01-22

## 2022-12-23 RX ORDER — CARIPRAZINE 4.5 MG/1
4.5 CAPSULE, GELATIN COATED ORAL DAILY
Qty: 30 CAPSULE | Refills: 0 | Status: SHIPPED | OUTPATIENT
Start: 2022-12-23 | End: 2022-12-24

## 2022-12-23 RX ADMIN — GABAPENTIN 300 MG: 300 CAPSULE ORAL at 20:32

## 2022-12-23 RX ADMIN — FLUOXETINE HYDROCHLORIDE 20 MG: 20 CAPSULE ORAL at 08:18

## 2022-12-23 RX ADMIN — LORAZEPAM 1 MG: 1 TABLET ORAL at 05:32

## 2022-12-23 RX ADMIN — CARIPRAZINE 4.5 MG: 4.5 CAPSULE, GELATIN COATED ORAL at 08:21

## 2022-12-23 RX ADMIN — MULTIPLE VITAMINS W/ MINERALS TAB 1 TABLET: TAB at 08:18

## 2022-12-23 RX ADMIN — LORAZEPAM 1 MG: 1 TABLET ORAL at 14:01

## 2022-12-23 RX ADMIN — GABAPENTIN 300 MG: 300 CAPSULE ORAL at 08:18

## 2022-12-23 ASSESSMENT — ANXIETY QUESTIONNAIRES
3. WORRYING TOO MUCH ABOUT DIFFERENT THINGS: NEARLY EVERY DAY
GAD7 TOTAL SCORE: 21
7. FEELING AFRAID AS IF SOMETHING AWFUL MIGHT HAPPEN: NEARLY EVERY DAY
GAD7 TOTAL SCORE: 21
1. FEELING NERVOUS, ANXIOUS, OR ON EDGE: NEARLY EVERY DAY
2. NOT BEING ABLE TO STOP OR CONTROL WORRYING: NEARLY EVERY DAY
5. BEING SO RESTLESS THAT IT IS HARD TO SIT STILL: NEARLY EVERY DAY
4. TROUBLE RELAXING: NEARLY EVERY DAY
6. BECOMING EASILY ANNOYED OR IRRITABLE: NEARLY EVERY DAY

## 2022-12-23 ASSESSMENT — COLUMBIA-SUICIDE SEVERITY RATING SCALE - C-SSRS
SUICIDE, SINCE LAST CONTACT: NO
LETHALITY/MEDICAL DAMAGE CODE MOST LETHAL ACTUAL ATTEMPT: SEVERE PHYSICAL DAMAGE, MEDICAL HOSPITALIZATION WITH INTENSIVE CARE REQUIRED
6. HAVE YOU EVER DONE ANYTHING, STARTED TO DO ANYTHING, OR PREPARED TO DO ANYTHING TO END YOUR LIFE?: NO
LETHALITY/MEDICAL DAMAGE CODE MOST LETHAL POTENTIAL ATTEMPT: BEHAVIOR LIKELY TO RESULT IN DEATH DESPITE AVAILABLE MEDICAL CARE
1. SINCE LAST CONTACT, HAVE YOU WISHED YOU WERE DEAD OR WISHED YOU COULD GO TO SLEEP AND NOT WAKE UP?: YES
TOTAL  NUMBER OF INTERRUPTED ATTEMPTS SINCE LAST CONTACT: NO
ATTEMPT SINCE LAST CONTACT: NO
2. HAVE YOU ACTUALLY HAD ANY THOUGHTS OF KILLING YOURSELF?: YES
5. HAVE YOU STARTED TO WORK OUT OR WORKED OUT THE DETAILS OF HOW TO KILL YOURSELF? DO YOU INTEND TO CARRY OUT THIS PLAN?: YES
TOTAL  NUMBER OF ABORTED OR SELF INTERRUPTED ATTEMPTS SINCE LAST CONTACT: NO

## 2022-12-23 ASSESSMENT — ACTIVITIES OF DAILY LIVING (ADL)
ADLS_ACUITY_SCORE: 35

## 2022-12-23 ASSESSMENT — PATIENT HEALTH QUESTIONNAIRE - PHQ9: SUM OF ALL RESPONSES TO PHQ QUESTIONS 1-9: 24

## 2022-12-23 NOTE — ED NOTES
Pt has been resting in her recliner isolated and withdrawn. Pt continues to CIWA after scoring her at about 1350 and per protocol a dose of ativan was administered to patient.

## 2022-12-23 NOTE — ED NOTES
Pt was woken up for medications and was compliant, calm and cooperative. Pt VSS and she had a CD assessment with a counselor on the IPAD and went back to sleep, denies any SI nor hallucination.

## 2022-12-23 NOTE — PROGRESS NOTES
Patient has been sleeping in recliner most of shift. She did awaken to eat dinner and finished about 50% and returned to sleep. Compliant with scheduled Gabapentin. Withdrawn, disengaged during interactions but cooperative with cares. No behavioral issues.

## 2022-12-23 NOTE — ED PROVIDER NOTES
"EmPATH Unit - Psychiatric Observation Discharge Summary  [facility] Emergency Department  Discharge Date: 12/23/2022    Amol Corado MRN: [id number]   Age: 37 year old YOB: 1985     Brief HPI & Initial ED Course     Chief Complaint   Patient presents with     Manic Behavior     Suicidal     HPI  Amol Corado is a 37 year old female with history notable for further complicated by substance use disorders involving prominent methamphetamine use with secondary alcohol and opiate use.  She is currently under observation status on the EmPATH unit for treatment of depressed mood and suicidal ideation following a recent relapse on methamphetamine while residing in a sober home.  A relational discord with her fiancé appears to have been the precipitating factor.    Overnight, there were no acute issues.  On reassessment today, the patient continues to endorse depressive symptoms however is hopeful regarding her treatment plan.  She completed a chemical health assessment today with referrals initiated for residential substance use disorder treatment.  She is tolerating her medications without side effects.  Withdrawal symptoms are beginning to subside.  She slept well last night.  Appetite is low however improving.  Energy is low.  Concentration is limited.  She denied active suicidal thoughts however anticipates increased intensity of suicidal thoughts emerge to an unstructured or an therapeutic setting.  There was no indication of psychosis or homicidal thoughts.        Physical Examination   BP: 108/76  Pulse: 91  Temp: 99.4  F (37.4  C)  Resp: 16  Height: 162.6 cm (5' 4\")  Weight: 56.5 kg (124 lb 9.6 oz)  SpO2: 97 %    Physical Exam  General: Appears stated age.   Neuro: Alert and fully oriented. Extremities appear to demonstrate normal strength on visual inspection.   Integumentary/Skin: no rash visualized, normal color    Psychiatric Examination   Appearance: awake, alert  Attitude:  cooperative  Eye " Contact:  fair  Mood:  anxious and sad   Affect:  intensity is blunted  Speech:  clear, coherent  Psychomotor Behavior:  no evidence of tardive dyskinesia, dystonia, or tics  Thought Process:  logical and linear  Associations:  no loose associations  Thought Content:  no evidence of suicidal ideation or homicidal ideation and no evidence of psychotic thought  Insight:  fair  Judgement:  fair  Oriented to:  time, person, and place  Attention Span and Concentration:  fair  Recent and Remote Memory:  fair  Language: able to name/identify objects without impairment  Fund of Knowledge: intact with awareness of current and past events    Results        Labs Ordered and Resulted from Time of ED Arrival to Time of ED Departure   COVID-19 VIRUS (CORONAVIRUS) BY PCR - Normal       Result Value    SARS CoV2 PCR Negative         Observation Course   The patient was found to have a psychiatric condition that would benefit from an observation stay in the emergency department for further psychiatric stabilization and/or coordination of a safe disposition. The plan upon observation admission included serial assessments of psychiatric condition, potential administration of medications if indicated, further disposition pending the patient's psychiatric course during the monitoring period.     Serial assessments of the patient's psychiatric condition were performed. Nursing notes were reviewed. During the observation period, the patient did not require medications for agitation, and did not require restraints/seclusion for patient and/or provider safety.     After a period of working with the treatment team on the EmPATH unit, the patient's mental state improved to allow a safe transition to outpatient care. After counseling on the diagnosis, work-up, and treatment plan, the patient was discharged. Close follow-up with a psychiatrist and/or therapist was recommended and community psychiatric resources were provided. Patient is to  return to the ED if any urgent or potentially life-threatening concerns.     Discharge Diagnoses:   Final diagnoses:   Alcohol use disorder, severe, dependence (H)   PTSD (post-traumatic stress disorder)   Bipolar 1 disorder, depressed (H)   Severe stimulant use disorder (H)       Treatment Plan:  -Continue Vraylar and Prozac for treatment of a depressive episode in the context of a bipolar disorder.  -Continue supportive interventions for stimulant withdrawal  -Discontinue CIWA protocol noting that the patient should be at low risk of experiencing a complicated alcohol withdrawal syndrome at this point.  -Chemical health assessment completed with referrals initiated to [facility] for residential treatment.  -Resume outpatient medication management appointments and psychotherapy  -Once a bed becomes available at a crisis facility, may proceed with transfer as she awaits bed availability for residential CD treatment.      --  Urbano Bullock MD  [facility] EMERGENCY DEPT  EmPATH Unit  12/23/2022      Urbano Bullock MD  12/23/22 2042

## 2022-12-23 NOTE — DISCHARGE INSTRUCTIONS
My Internal Coping Strategies include the following:  take a bath, belly breathing, arts and crafts, fidget toys and use my coping skills    Safety Concerns    How To Identify Situations That Make Your Mental Health Worse:  Triggers are things that make your mental health worse.  Look at the list below to help you find your triggers and what you can do about them.  1. Identify Early Warning Signs:  Sometimes symptoms return, even when people do their best to stay well. Symptoms can develop over a short period of time with little or no warning, but most of the time they emerge gradually over several weeks.  Early warning signs are changes that people experience when a relapse is starting. Some early warning signs are common and others are not as common.    Common Early Warning Signs:    Feeling tense or nervous, Eating less or eating more, Trouble sleeping -either too much or too little sleep, Feeling depressed or low, Feeling irritable, Feeling like not being around other people, Trouble concentrating, Urges to harm self and Urges to use drugs or alcohol              2. Identify action steps to take when warning signs are noticed:  Taking Action- It is important to take action if you are experiencing early warning signs of a relapse.  The faster you act, the more likely it is that you can avoid a full relapse.  It is helpful to identify several specific ways to cope with symptoms.     The following is my list of symptoms and coping strategies that I can use when they are present:      Aftercare Plan  If I am feeling unsafe or I am in a crisis, I will:   Contact my established care providers   Call the National Suicide Prevention Lifeline: 988  Go to the nearest emergency room   Call 911     Safety Planning     Warning signs: disrupted sleep, appetite or mood, increased anger, agitation or irritability, feeling depressed or hopeless, isolating, increased crying, decreased productivity, seeing or hearing things  that aren't there, thoughts of not wanting to live anymore or of actually killing myself, thoughts of hurting others.     Things I can do alone to cope or help me feel better: movies/tv, music, reading, games, drawing/coloring/painting or other art, essential oils, exercise, cleaning/organizing, puzzles, crossword puzzles, word search, Sudoku     Things that I can do with others to cope or help me feel better: sometimes just talking or spending time with someone else, sharing a meal or having coffee, watching a movie or playing a game, going for a walk or exercising     I can also use community resources including mental health hotlines, [facility] crisis teams, or apps.     Things I can use or do for distraction: movies/tv, music, reading, games, drawing/coloring/painting or other art, essential oils, exercise, cleaning/organizing, puzzles, crossword puzzles, word search, Sudoku       I can also download a meditation or relaxation laurel, like Calm, Headspace, or Insight Timer (all three offer a free version)     Changes I can make to support my mental health: Attend scheduled mental health therapy and psychiatric appointments. Take my medications as prescribed. Maintain a daily schedule/routine. Abstain from all mood-altering substances, including drugs, alcohol, or medications not currently prescribed to me. Implement a self-care routine.         People in my life that I can ask for help: friends or family, trusted teachers/staff/colleagues, trusted members of my community or place of Mu-ism, mental health crisis lines, or 911     Other things that are important when I m in crisis: to remember that the feelings I am having right now are temporary, and it won't feel like this forever, and that it is okay and important to ask for help     Nutrition:        Eat at least 5 servings of fruits and vegetables each day.       Eat whole-grain bread, whole-wheat pasta and brown rice instead of white grains and rice.       Get  "adequate Calcium and Vitamin D.      Lifestyle       Exercise at least 150 minutes a week (30 minutes a day, 5 days of the week). This will help you control your weight and prevent disease.       Limit alcohol to one drink per day.       No smoking.        Wear sunscreen to prevent skin cancer.       See your dentist every six months for an exam and cleaning.     Your [facility] has a mental health crisis team you can call 24/7: [facility] Mobile Crisis  [phone] (adults)  [phone] (children)    Additional resources and information:     -Discharge to [facility]  -Follow up with your  on Monday 12/26/22  -Follow through with CD treatment referrals to [facility] & any other referrals made by crisis residence       Crisis Lines  Crisis Text Line  Text 563077  You will be connected with a trained live crisis counselor to provide support.    Por ginny, texto  ROSCOE a 075225 o texto a 442-AYUDAME en WhatsApp    [employer] (LGBTQ Youth Crisis Line)  9.285.101.0194  text START to 479-536      [employer]  Fast Tracker  Linking people to mental health and substance use disorder resources  Paperhater.comn.org     [place] Mental [facility] Warm Line  Peer to peer support  Monday thru Saturday, 12 pm to 10 pm  351.867.8946 or 7.866.985.5554  Text \"Support\" to 47913    National [place] [employer] Mental [employer]ness (MARK)  678.278.6660 or 1.888.MARK.HELPS      Mental Health Apps  My3  https://myMilaap Social Venturespp.org/    VirtualHopeBox  https://[facility].org/apps/virtual-hope-box/    TIPP?stands for?Temperature,?Intense exercise,?Paced breathing, and?Paired muscle relaxation.     TEMPERATURE     When we re upset, our bodies often feel hot. To counter this, splash your face with cold water, hold an ice cube, or let the car s AC blow on your face. Changing your body temperature will help you cool down--both physically and emotionally.     INTENSE EXERCISE     Do intense exercise to " match your intense emotion. You re not a marathon runner? That s okay, you don t need to be. Sprint down to the end of the street, jump in the pool for a few laps, or do jumping jacks until you ve tired yourself out. Increasing oxygen flow helps decrease stress levels. Plus, it s hard to stay dangerously upset when you re exhausted.     PACED BREATHING     Even something as simple as controlling your breath can have a profound impact on reducing emotional pain. There are many different types of breathing exercises. If you have a favorite, breathe it out. If you don t, try a technique called  box breathing . Each breath interval will be four seconds long. Take in air four seconds, hold it in four seconds, breathe out four, and hold four. And then start again. Continue to focus on this breathing pattern until you feel more calm. Steady breathing reduces your body s fight or flight response.     PAIRED MUSCLE RELAXATION     The science of paired muscle relaxation is fascinating. When you tighten a voluntary muscle, relax it, and allow it to rest, the muscle will become more relaxed than it was before it was tightened. Relaxed muscles require less oxygen,?so your breathing and heart rate will slow down. Try this technique by focusing on a group of muscles, such as the muscles in your arms. Tighten the muscles as much as you can for five seconds. Then let go of the tension. Let the muscles relax, and you ll begin to relax, as well.     Additional Information  Today you were seen by a licensed mental health professional through Triage and Transition services, [facility])  for a crisis assessment in the Emergency Department at [facility].  It is recommended that you follow up with your established providers (psychiatrist, mental health therapist, and/or primary care doctor - as relevant) as soon as possible. Coordinators from [facility] will be calling you in the next 24-48 hours to ensure that  you have the resources you need.  You can also contact [facility] coordinators directly at [phone]. You may have been scheduled for or offered an appointment with a mental health provider. [facility] maintains an extensive network of licensed behavioral health providers to connect patients with the services they need.  We do not charge providers a fee to participate in our referral network.  We match patients with providers based on a patient's specific needs, insurance coverage, and location.  Our first effort will be to refer you to a provider within your care system, and will utilize providers outside your care system as needed.
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
## 2022-12-23 NOTE — PROGRESS NOTES
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 is only irritable) have persisted and have been present to a significant degree:   - inflated self-esteem or grandiosity    - decreased need for sleep (e.g., feels rested after only 3 hours of sleep)    - more talkative than usual or pressure to keep talking    - flight of ideas or subjectivie experience that thoughts are racing   - distractibility   - increase in goal-directed activity   - excessive involvement in pleasurable activities that have a high potential for painful consequences, such as spending money or sexual indiscretion.  C. The mood disturbance is sufficiently severe to cause marked impairment in social or occupational functioning or to necessitate hospitalization to prevent harm to self or others, or there are severe psychotic features  E. The episode is sufficiently severe enough to cause marked impairment in social or occupational functioning or to necessitate hospitalization to prevent harm to self or others, or there are psychotic features Obsessive Compulsive Disorder Criteria: Obsessive Compulsive Disorder    (1) recurrent and persistent  thoughts, impulses, or images that are experienced, at some time during the disturbance, as intrusive and inappropriate and that cause marked anxiety or distress     (2) the thoughts, impulses, or images are not simply excessive worries about real-life problems     (3) the client attempts to ignore or suppress such thoughts, impulses, or images, or to neutralize them with some other thought or action     (4) the client recognizes that the obsessional thoughts, impulses, or images are a product of his or her own mind (not imposed from without as in thought insertion)     (2) the behaviors or mental acts are aimed at preventing or reducing distress or preventing some dreaded event or situation; however, these behaviors or mental acts either are not connected in a realistic way with what they are designed to neutralize or prevent or are clearly excessive   At some point during the course of the disorder, the person has recognized that the obsessions or compulsions are excessive or unreasonable  The obsessions or compulsions cause marked distress, are time consuming (take more than 1 hour a day), or significantly interfere with the person's normal routine, occupational (or academic) functioning, or usual social activities or relationships.   The content of the obsessions or compulsions are not restricted to another Axis I Disorder (e.g., preoccupation with food in the presence of an Eating Disorders; hair pulling in the presence of Trichotillomania; concern with appearance in the presence of Body Dysmorphic Disorder; preoccupation with drugs in the presence of a Substance Use Disorder; preoccupation with having a serious illness in the presence of Hypochondriasis; preoccupation with sexual urges or fantasies in the presence of a Paraphilia; or guilty ruminations in the presence of Major Depressive Disorder).   The disturbance is not due to the direct physiological effects of a substance (e.g., a drug of abuse, a  medication) or a general medical condition Substance Use Disorder Substance is often taken in larger amounts or over a longer period than was intended.  Met for:  Amphetamines and Cocaine There is persistent desire or unsuccessful efforts to cut down or control use of the substance.  Met for:  Amphetamines and Cocaine  A great deal of time is spent in activities necessary to obtain the substance, use the substance, or recover from its effects.  Met for:  Amphetamines and Cocaine Craving, or a strong desire or urge to use the substance.  Met for:  Amphetamines and Cocaine Recurrent use of the substance resulting in a failure to fulfill major role obligations at work, school, or home.  Met for:  Amphetamines and Cocaine Continued use of the substance despite having persistent or recurrent social or interpersonal problems caused or exacerbated by the effects of its use.  Met for:  Amphetamines and Cocaine Important social, occupational, or recreational activities are given up or reduced because of the substance.  Met for:  Amphetamines and Cocaine Recurrent use of the substance in which it is physically hazardous.  Met for:  Amphetamines and Cocaine Use of the substance is continued despite knowledge of having a persistent or recurrent physical or psychological problem that is likely to have been cause or exacerbated by the substance.  Met for:  Amphetamines and Cocaine Tolerance:  either a need for markedly increased amounts of the substance to achieve the desired effect or a markedly diminished effect with continued use of the same amount of the substance.  Met for:  Amphetamines and Cocaine Post- Traumatic Stress Disorder  A. The person has been exposed to a traumatic event in which both of the following were present:     (1) the person experienced, witnessed, or was confronted with an event or events that involved actual or threatened death or serious injury, or a threat to the physical integrity of self or  others  B. The traumatic event is persistently reexperienced in one (or more) of the following ways:     - Recurrent and intrusive distressing recollections of the event, including images, thoughts, or perceptions. Note: In young children, repetitive play may occur in which themes or aspects of the trauma are expressed.      - Recurrent distressing dreams of the event. Note: In children, there may be frightening dreams without recognizable content.      - Acting or feeling as if the traumatic event were recurring (includes a sense of reliving the experience, illusions, hallucinations, and dissociative flashback episodes, including those that occur on awakening or when intoxicated). Note: In young children, trauma-specific reenactment may occur.      - Intense psychological distress at exposure to internal or external cues that symbolize or resemble an aspect of the traumatic event.   C. Persistent avoidance of stimuli associated with the trauma and numbing of general responsiveness (not present before the trauma), as indicated by three (or more) of the following:     - Efforts to avoid thoughts, feelings, or conversations associated with the trauma.      - Efforts to avoid activities, places, or people that arouse recollections of the trauma.      - Markedly diminished interest or participation in significant activities.   D. Persistent symptoms of increased arousal (not present before the trauma), as indicated by two (or more) of the following:     - Difficulty falling or staying asleep.      - Irritability or outbursts of anger.      - Difficulty concentrating.      - Hypervigilance.      - Exaggerated startle response.   E. Duration of the disturbance is more than 1 month.  F. The disturbance causes clinically significant distress or impairment in social, occupational, or other important areas of functioning.      Functional Status:  Patient reports the following functional impairments:  childcare / parenting,  relationship(s), self-care and social interactions.     Nonprogrammatic care:  Patient is requesting basic services to address current mental health concerns.    Clinical Summary:  1. Reason for assessment: Referral from [facility].  2. Psychosocial, Cultural and Contextual Factors: Homeless and engagement recently called off.  3. Principal DSM5 Diagnoses  (Sustained by DSM5 Criteria Listed Above):   296.42 Bipolar I Disorder Current or Most Recent Episode Manic, Moderate   300.02 (F41.1) Generalized Anxiety Disorder.  4. Other Diagnoses that is relevant to services:   300.3 (F42) Obsessive Compulsive Disorder  309.81 (F43.10) Posttraumatic Stress Disorder (includes Posttraumatic Stress Disorder for Children 6 Years and Younger)  With dissociative symptoms  Substance-Related & Addictive Disorders 292.89 Stimulant Intoxication,  292.89 Amphetamine or other stimulant With perceptual disturbances:  (F15.222) With use disorder, moderate or severe and 292.89 Cocaine, With perceptual disturbances:  (F14.222) With use disorder, moderate or severe.  5. Provisional Diagnosis: None.  6. Prognosis: Relieve Acute Symptoms.  7. Likely consequences of symptoms if not treated: If untreated, patient's mental health will likely deteriorate and may require a higher level of care.  8. Client strengths include:  has a previous history of therapy .     Recommendations:     1. Plan for Safety and Risk Management:   Safety and Risk: A safety and risk management plan has been developed including: Patient consented to co-developed safety plan on 12/23/2022.  Safety and risk management plan was reviewed.   Patient agreed to use safety plan should any safety concerns arise.  A copy was made available to the patient..          Report to child / adult protection services was NA.     2. Patient's identified None.     3. Initial Treatment will focus on:    Anxiety   Mood Instability  Alcohol / Substance Use.     4. Resources/Service  Plan:    services are not indicated.   Modifications to assist communication are not indicated.   Additional disability accommodations are not indicated.      5. Collaboration:   Collaboration / coordination of treatment will be initiated with the following  support professionals: None.      6.  Referrals:   The following referral(s) will be initiated: Residential Chemical Health Treatment. Next Scheduled Appointment: Referral sent.      A Release of Information has been obtained for the following: [facility].     Emergency Contact Pancho Cleary (significant other) [phone] was obtained.      Clinical Substantiation/medical necessity for the above recommendations:     Summary: Patient is a 37 year old female with a history of Anxiety Disorder, Obsessive Compulsive Disorder, Bipolar I, PTSD and polysubstance abuse who presents for an assessment of mental  And chemical health needs. Patient has a history of psychiatric hospitalizations. Patient has a history of SI andSA. The patient's acute suicide risk was determined to be high due to the following factors: suicidal thoughts and suicide attempts. Patient denies experiencing command hallucinations, and has no immediate access to firearms. The patient's acute risk could be higher if noncompliant with their treatment plan, medications, follow-up appointments or using illicit substances or alcohol. Protective factors include: spirituality, forward/future oriented thinking and dedication to family/friends      Placement/Program Barriers Identified: possible wait list    Referral: [facility]      7. JULIO:    JULIO:  Discussed the general effects of drugs and alcohol on health and well-being. Recommendations:  Participate in residential program.     8. Records:   These were reviewed at time of assessment. Information in this assessment was obtained from the medical record and provided by patient who is a fair historian.  Patient will have open access to their mental  health medical record.    9.   Interactive Complexity: No      Provider Name/ Credentials:  Macario Severino, [facility]    December 23, 2022                Outpatient Mental Health Services - Adult    MY COPING PLAN FOR SAFETY        Name: Amol Corado  YOB: 1985  Date: December 23, 2022   My primary care provider:   My primary care clinic:   My prescriber:   Other care team support:     My Triggers:  Relationship conflict and Legal Difficulties     Additional People, Places, and Things that I can access for support: Fiance and Family          What is important to me and makes life worth living: My son's, my fiance and my soberiety.         GREEN    Good Control  1. I feel good  2. No suicidal thoughts   3. Can work, sleep and play      Action Steps  1. Self-care: balanced meals, exercising, sleep practices, etc.  2. Take your medications as prescribed.  3. Continue meetings with therapist and prescriber.  4.  Do the healthy things that I enjoy.             YELLOW  Getting Worse  I have ANY of these:  1. I do not feel good  2. Difficulty Concentrating  3. Sleep is changing  4. Increase/Change in my thoughts to hurt self and/or others, but I can still manage and not act on it.   5. Not taking care of self.               Action Steps (in addition to the above):  1. Inform your therapist and psychiatric prescriber/PCP.  2. Keep taking your medications as prescribed.    3. Turn to people you can ask for help.  4. Use internal coping strategies -see below.  5. Create safe environment: lock and limit medications, notify friends/family of increase in symptoms and reduce means to other identified method             RED  Get Help  If I have ANY of these:  1. Current and uncontrollable thoughts and/or behaviors to hurt self and/or others.      Actions to manage my safety  1. Contact your emergency person  Pancho Cleary (significant other) [phone]  2. Call my crisis team- [facility] [phone]  [facility] or [facility] [phone] [facility]  3. Or Call 911 or go to the emergency room right away          My Internal Coping Strategies include the following:  take a bath, belly breathing, arts and crafts, fidget toys and use my coping skills    [Safety Plan via email to unit]     Safety Concerns  How To Identify Situations That Make Your Mental Health Worse:  Triggers are things that make your mental health worse.  Look at the list below to help you find your triggers and what you can do about them.     1. Identify Early Warning Signs:    Sometimes symptoms return, even when people do their best to stay well. Symptoms can develop over a short period of time with little or no warning, but most of the time they emerge gradually over several weeks.  Early warning signs are changes that people experience when a relapse is starting. Some early warning signs are common and others are not as common.   Common Early Warning Signs:    Feeling tense or nervous, Eating less or eating more, Trouble sleeping -either too much or too little sleep, Feeling depressed or low, Feeling irritable, Feeling like not being around other people, Trouble concentrating, Urges to harm self and Urges to use drugs or alcohol     2. Identify action steps to take when warning signs are noticed:    Taking Action- It is important to take action if you are experiencing early warning signs of a relapse.  The faster you act, the more likely it is that you can avoid a full relapse.  It is helpful to identify several specific ways to cope with symptoms.      The following is my list of symptoms and coping strategies that I can use when they are present:    Symptom Coping Strategies   Anxiety -Talk with someone in your support system and let him or her know how you are feeling.  -Use relaxation techniques such as deep breathing or imagery.  -Use positive affirmations to counteract negative  self-talk such as  I am learning to let go of worry.    Depression - Schedule your day; include activities you have to do and activities you enjoy doing.  - Get some exercise - walk, run, bike, or swim.  - Give yourself credit for even the smallest things you get done.   Sleep Difficulties   - Go to sleep at the same time every day.  - Do something relaxing before bed, such as drinking herbal tea or listening to music.  - Avoid having discussions about upsetting topics before going to bed.   Delusions   - Distract yourself from the disturbing thought by doing something that requires your attention such as a puzzle.  - Check out your beliefs by talking to someone you trust.    Hallucinations   - Use headphones to listen to music.  - Tell voices to  stop  or say to yourself,  I am safe.   - Ignore the hallucinations as much as possible; focus on other things.   Concentration Difficulties - Minimize distractions so there is only one thing for you to focus on at a time.    - Ask the person you are having a conversation with to slow down or repeat things you are unsure of.

## 2022-12-24 VITALS
SYSTOLIC BLOOD PRESSURE: 117 MMHG | OXYGEN SATURATION: 98 % | HEIGHT: 64 IN | BODY MASS INDEX: 21.27 KG/M2 | HEART RATE: 113 BPM | TEMPERATURE: 99.2 F | WEIGHT: 124.6 LBS | RESPIRATION RATE: 16 BRPM | DIASTOLIC BLOOD PRESSURE: 77 MMHG

## 2022-12-24 PROCEDURE — 250N000013 HC RX MED GY IP 250 OP 250 PS 637: Performed by: PSYCHIATRY & NEUROLOGY

## 2022-12-24 PROCEDURE — G0378 HOSPITAL OBSERVATION PER HR: HCPCS

## 2022-12-24 RX ADMIN — GABAPENTIN 300 MG: 300 CAPSULE ORAL at 08:47

## 2022-12-24 RX ADMIN — FLUOXETINE HYDROCHLORIDE 20 MG: 20 CAPSULE ORAL at 08:47

## 2022-12-24 RX ADMIN — MULTIPLE VITAMINS W/ MINERALS TAB 1 TABLET: TAB at 08:47

## 2022-12-24 RX ADMIN — CARIPRAZINE 4.5 MG: 4.5 CAPSULE, GELATIN COATED ORAL at 08:48

## 2022-12-24 ASSESSMENT — COLUMBIA-SUICIDE SEVERITY RATING SCALE - C-SSRS
ATTEMPT SINCE LAST CONTACT: NO
SUICIDE, SINCE LAST CONTACT: NO
5. HAVE YOU STARTED TO WORK OUT OR WORKED OUT THE DETAILS OF HOW TO KILL YOURSELF? DO YOU INTEND TO CARRY OUT THIS PLAN?: YES
2. HAVE YOU ACTUALLY HAD ANY THOUGHTS OF KILLING YOURSELF?: YES
LETHALITY/MEDICAL DAMAGE CODE MOST LETHAL POTENTIAL ATTEMPT: BEHAVIOR LIKELY TO RESULT IN DEATH DESPITE AVAILABLE MEDICAL CARE
TOTAL  NUMBER OF INTERRUPTED ATTEMPTS SINCE LAST CONTACT: NO
TOTAL  NUMBER OF ABORTED OR SELF INTERRUPTED ATTEMPTS SINCE LAST CONTACT: NO
LETHALITY/MEDICAL DAMAGE CODE MOST LETHAL ACTUAL ATTEMPT: SEVERE PHYSICAL DAMAGE, MEDICAL HOSPITALIZATION WITH INTENSIVE CARE REQUIRED
1. SINCE LAST CONTACT, HAVE YOU WISHED YOU WERE DEAD OR WISHED YOU COULD GO TO SLEEP AND NOT WAKE UP?: YES
6. HAVE YOU EVER DONE ANYTHING, STARTED TO DO ANYTHING, OR PREPARED TO DO ANYTHING TO END YOUR LIFE?: NO

## 2022-12-24 ASSESSMENT — ACTIVITIES OF DAILY LIVING (ADL)
ADLS_ACUITY_SCORE: 35

## 2022-12-24 NOTE — PROGRESS NOTES
Patient agreed to discharge plan going to [facility] while waiting for an an opening at [facility] for CD treatment. Pt had an interview with [facility] who accepted pt on basis that they will come with atleast 10 days worth of their medications that are nor , Prozac and Gabapentin were all filled at [facility] except Vrylar that was too soon to refill. Pt however admitted having more of the medications at sober house and will drive there to pick them up and then go to [facility]. Discharge instructions reviewed with patient including follow-up care plan. Medications:two medications. Reviewed safety plan and outpatient resources. Denies SI and HI. All belongings that were brought into the hospital have been returned to patient. Escorted off the unit at six accompanied by [facility] staff. Discharged to [facility]  via  Own car.

## 2022-12-24 NOTE — ED NOTES
Pt was asleep until woken up for VS and medications. Calm and cooperative but reportedly slept well last night uneventful. Pt has a flat affect and some insight. Pt was told that [facility] was [facility] to see if they have an opening, a return call to interview pt is still awaited. Pt is ready to be discharge into a crisis residence, medications are in the med cabinet when pt is ready to discharge.
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
## 2023-08-27 ENCOUNTER — HEALTH MAINTENANCE LETTER (OUTPATIENT)
Age: 38
End: 2023-08-27

## 2024-10-18 ENCOUNTER — HOSPITAL ENCOUNTER (EMERGENCY)
Facility: CLINIC | Age: 39
Discharge: HOME | End: 2024-10-18
Attending: STUDENT IN AN ORGANIZED HEALTH CARE EDUCATION/TRAINING PROGRAM | Admitting: STUDENT IN AN ORGANIZED HEALTH CARE EDUCATION/TRAINING PROGRAM
Payer: COMMERCIAL

## 2024-10-18 VITALS
DIASTOLIC BLOOD PRESSURE: 67 MMHG | RESPIRATION RATE: 18 BRPM | OXYGEN SATURATION: 100 % | HEIGHT: 64 IN | SYSTOLIC BLOOD PRESSURE: 114 MMHG | TEMPERATURE: 98.6 F | BODY MASS INDEX: 21.17 KG/M2 | WEIGHT: 124 LBS | HEART RATE: 104 BPM

## 2024-10-18 DIAGNOSIS — T40.601A OVERDOSE OPIATE, ACCIDENTAL OR UNINTENTIONAL, INITIAL ENCOUNTER (H): ICD-10-CM

## 2024-10-18 PROCEDURE — 99291 CRITICAL CARE FIRST HOUR: CPT | Mod: 25 | Performed by: STUDENT IN AN ORGANIZED HEALTH CARE EDUCATION/TRAINING PROGRAM

## 2024-10-18 PROCEDURE — 99291 CRITICAL CARE FIRST HOUR: CPT | Performed by: STUDENT IN AN ORGANIZED HEALTH CARE EDUCATION/TRAINING PROGRAM

## 2024-10-18 PROCEDURE — 96375 TX/PRO/DX INJ NEW DRUG ADDON: CPT | Performed by: STUDENT IN AN ORGANIZED HEALTH CARE EDUCATION/TRAINING PROGRAM

## 2024-10-18 PROCEDURE — 99245 OFF/OP CONSLTJ NEW/EST HI 55: CPT | Performed by: INTERNAL MEDICINE

## 2024-10-18 PROCEDURE — 96374 THER/PROPH/DIAG INJ IV PUSH: CPT | Performed by: STUDENT IN AN ORGANIZED HEALTH CARE EDUCATION/TRAINING PROGRAM

## 2024-10-18 PROCEDURE — 250N000011 HC RX IP 250 OP 636: Performed by: STUDENT IN AN ORGANIZED HEALTH CARE EDUCATION/TRAINING PROGRAM

## 2024-10-18 PROCEDURE — 99417 PROLNG OP E/M EACH 15 MIN: CPT | Performed by: INTERNAL MEDICINE

## 2024-10-18 RX ORDER — ONDANSETRON 4 MG/1
4 TABLET, ORALLY DISINTEGRATING ORAL EVERY 6 HOURS PRN
Qty: 12 TABLET | Refills: 0 | Status: SHIPPED | OUTPATIENT
Start: 2024-10-18 | End: 2024-10-21

## 2024-10-18 RX ORDER — ONDANSETRON 2 MG/ML
4 INJECTION INTRAMUSCULAR; INTRAVENOUS EVERY 30 MIN PRN
Status: DISCONTINUED | OUTPATIENT
Start: 2024-10-18 | End: 2024-10-18 | Stop reason: HOSPADM

## 2024-10-18 RX ORDER — NALOXONE HYDROCHLORIDE 0.4 MG/ML
0.4 INJECTION, SOLUTION INTRAMUSCULAR; INTRAVENOUS; SUBCUTANEOUS ONCE
Status: COMPLETED | OUTPATIENT
Start: 2024-10-18 | End: 2024-10-18

## 2024-10-18 RX ADMIN — ONDANSETRON 4 MG: 2 INJECTION INTRAMUSCULAR; INTRAVENOUS at 04:53

## 2024-10-18 RX ADMIN — NALOXONE HYDROCHLORIDE 0.4 MG: 0.4 INJECTION, SOLUTION INTRAMUSCULAR; INTRAVENOUS; SUBCUTANEOUS at 07:05

## 2024-10-18 ASSESSMENT — ACTIVITIES OF DAILY LIVING (ADL)
ADLS_ACUITY_SCORE: 35

## 2024-10-18 ASSESSMENT — COLUMBIA-SUICIDE SEVERITY RATING SCALE - C-SSRS
2. HAVE YOU ACTUALLY HAD ANY THOUGHTS OF KILLING YOURSELF IN THE PAST MONTH?: NO
6. HAVE YOU EVER DONE ANYTHING, STARTED TO DO ANYTHING, OR PREPARED TO DO ANYTHING TO END YOUR LIFE?: NO
1. IN THE PAST MONTH, HAVE YOU WISHED YOU WERE DEAD OR WISHED YOU COULD GO TO SLEEP AND NOT WAKE UP?: NO

## 2024-10-18 NOTE — Clinical Note
Amol Cleary was seen and treated in our emergency department on 10/18/2024.  She may return to work on 10/19/2024.  Amol Cleary was seen in the ED overnight for treatment and evaluation. Her recovery will require her to continue to recuperate and convalesce today.  Please excuse any missed work obligations due to this.  Thank you for your understanding and cooperation in the provision of her medical care.     If you have any questions or concerns, please don't hesitate to call.      Mario Alberto Knight MD

## 2024-10-18 NOTE — ED PROVIDER NOTES
ED Provider Note  [facility]      History     Chief Complaint   Patient presents with    Drug Overdose     HPI  Amol Corado is a 38 year old female who reports past medical history of mental health issues, substance use disorder.  She is brought in with her wife by EMS after being found unresponsive and hypoxic.  Believed they were using cocaine which they snorted.  Responded to Narcan prehospital.  Mild nausea otherwise no acute concerns.  States she has to leave for work sometime later this morning and is hoping to get out of here in time for that.  Denies regular use of opiates.    Past Medical History  Past Medical History:   Diagnosis Date    Bipolar 1 disorder (H)      Past Surgical History:   Procedure Laterality Date    BIOPSY CERVICAL, LOCAL EXCISION, SINGLE/MULTIPLE  2005     ondansetron (ZOFRAN ODT) 4 MG ODT tab  cariprazine (VRAYLAR) 4.5 MG capsule  FLUoxetine (PROZAC) 20 MG capsule  gabapentin (NEURONTIN) 300 MG capsule  levonorgestrel (MIRENA) 20 MCG/24HR IUD      Allergies   Allergen Reactions    Bactrim [Sulfamethoxazole-Trimethoprim] Other (See Comments)     pancreatitis    Mirtazapine      Weight gain, polyphagia, not feeling well in general    Nuts Unknown     Family History  Family History   Problem Relation Age of Onset    Asthma Mother     Colon Cancer Mother 45.00        part of colon removed    Alcoholism Father     Depression Father     Early Death Father         following stroke    Heart Disease Father     Mental Illness Father     Prostate Cancer Father     Cerebrovascular Disease Father     Asthma Son     Diabetes Maternal Aunt     Cancer Maternal Grandmother     Breast Cancer Paternal Grandmother         remission    Cerebrovascular Disease Paternal Grandmother     Heart Disease Paternal Grandfather     Cerebrovascular Disease Paternal Grandfather      Social History   Social History     Tobacco Use    Smoking status: Every Day     Current packs/day: 0.25     " Average packs/day: 0.3 packs/day for 25.9 years (6.5 ttl pk-yrs)     Types: Cigarettes     Start date: 11/6/1998    Smokeless tobacco: Former    Tobacco comments:     Reports smoking e-cigarettes daily.   Substance Use Topics    Alcohol use: Yes    Drug use: Yes     Types: Methamphetamines     Comment: Drug use: states no THC, no heroin, last used uotoutz91/22/2019      Past medical history, past surgical history, medications, allergies, family history, and social history were reviewed with the patient. No additional pertinent items.   A medically appropriate review of systems was performed with pertinent positives and negatives noted in the HPI, and all other systems negative.    Physical Exam   BP: 120/86  Pulse: 103  Temp: (!) 95.9  F (35.5  C)  Resp: 18  Height: 162.6 cm (5' 4\")  Weight: 56.2 kg (124 lb)  SpO2: 100 %  Physical Exam  Vital Signs Reviewed  Gen: Well nourished, well developed, resting comfortably, no acute distress  HEENT: NC/AT, PERRL, EOMI, MMM  Neck: Supple, FROM  CV: Regular Rate, no murmur/rub/gallop  Lungs/Chest: Normal Effort, CTAB  Abd: Non-distended, non-tender  MSK/Back: FROM, no visible deformity  Neuro: A&Ox3, GCS 15, CN II-XII unremarkable  Skin: Warm, Dry, Intact, no visible lesions    ED Course, Procedures, & Data      Procedures                No results found for any visits on 10/18/24.  Medications   ondansetron (ZOFRAN) injection 4 mg (4 mg Intravenous $Given 10/18/24 2051)   naloxone (NARCAN) injection 0.4 mg (0.4 mg Intravenous $Given 10/18/24 0705)     Labs Ordered and Resulted from Time of ED Arrival to Time of ED Departure - No data to display  No orders to display          Critical Care Addendum  My initial assessment, based on my review of prehospital provider report, review of nursing observations, review of vital signs, focused history, and physical exam, established a high suspicion that Amol Cleary has a dangerous drug overdose, which requires immediate " intervention, and therefore she is critically ill.     After the initial assessment, the care team initiated multiple lab tests and initiated medication therapy with IV narcan  to provide stabilization care. Due to the critical nature of this patient, I reassessed nursing observations, vital signs, physical exam, mental status, neurologic status, and respiratory status multiple times prior to her disposition.     Time also spent performing documentation, discussion with family to obtain medical information for decision making, reviewing test results, and coordination of care.     Critical care time (excluding teaching time and procedures): 35 minutes.       Assessment & Plan    38-year-old female with history of mental health issues and substance use disorder presenting after what appears to be an unintentional opiate overdose due to adulterated sympathomimetics.  On arrival patient is afebrile awake alert breathing easily.  Will monitor to see if there is need for rescue Narcan.  Otherwise anticipate discharge with outpatient substance use resources.    Patient remained Stalevo for significant amount of her time in observation in the emergency department.  When I was reassessing the patient for discharge I noted that her waveform remained good but her SpO2 a precipitously dropped.  She responded to verbal stimulus and started taking deep breaths.  She was then given a dose of IV Narcan with improvement in her mental and respiratory status.  Will continue to monitor afterwards and hopefully still be able to discharge her.  I suspect that there was some additional contaminants beyond simply fentanyl based on her timeline and presentation.    Signed out to Dr. Ritchie.    New Prescriptions    ONDANSETRON (ZOFRAN ODT) 4 MG ODT TAB    Take 1 tablet (4 mg) by mouth every 6 hours as needed.       Final diagnoses:   Overdose opiate, accidental or unintentional, initial encounter (H)       MD FLAKITA Hutchinson Jr.  [facility] EMERGENCY DEPARTMENT  10/18/2024     Mario Alberto Knight MD  10/18/24 0763

## 2024-10-18 NOTE — ED TRIAGE NOTES
Triage Assessment (Adult)       Row Name 10/18/24 0446          Triage Assessment    Airway WDL WDL        Respiratory WDL    Respiratory WDL WDL        Skin Circulation/Temperature WDL    Skin Circulation/Temperature WDL WDL        Cardiac WDL    Cardiac WDL WDL        Peripheral/Neurovascular WDL    Peripheral Neurovascular WDL WDL        Cognitive/Neuro/Behavioral WDL    Cognitive/Neuro/Behavioral WDL WDL     Level of Consciousness alert     Arousal Level opens eyes spontaneously     Orientation oriented x 4        Pupils (CN II)    Pupil PERRLA yes     Pupil Size Left 1 mm     Pupil Size Right 1 mm        Williamson Coma Scale    Best Eye Response 4-->(E4) spontaneous     Best Motor Response 6-->(M6) obeys commands     Best Verbal Response 5-->(V5) oriented     Williamson Coma Scale Score 15                   Per EMS, call at 0350.  Patient and wife found in bed in apartment agonal breathing.  Patient given 4 mg nasal Narcan and IV started. Patient given half dose of IV Narcan. O2 Sats in the 30s.

## 2024-10-18 NOTE — Clinical Note
Amol Cleary was seen and treated in our emergency department on 10/18/2024.  She may return to work on 10/19/2024.  Amol Cleary was seen in the ED overnight for treatment and evaluation. Her recovery will require her to continue to recuperate and convalesce today.  Please excuse any missed work obligations due to this.  Thank you for your understanding and cooperation in the provision of her medical care.     If you have any questions or concerns, please don't hesitate to call.      Mario Alberto Knihgt MD

## 2024-10-18 NOTE — ED TRIAGE NOTES
[facility]  Friend found them both overdose  30-40s sats  18g lac  1mg nasal narcan  1mg iv narcan   Walked to rig  Cooperative

## 2024-10-18 NOTE — DISCHARGE INSTRUCTIONS
Thank you for coming to the [facility].  We are sorry to hear about your unintentional overdose last night.  We are glad that you were stable and did well after the paramedics provided life-saving intervention.    Please reach out to your primary care team in the outpatient setting to discuss your substance use history.  We encourage you to look into local resources for drug test strips if you continue to use substances that may be laced with fentanyl.    If you develop any new or concerning symptoms you can always return to the emergency department.

## 2024-10-18 NOTE — CONSULTS
[facility]   Consult Note - Addiction Service     Date of Admission:  10/18/2024    Consult Requested by: ED  Reason for Consult: opiate overdose, accidental.    Assessment & Plan   Assessment & Plan   The patient has a PMHx that includes BPAD, cocaine use disorder 38F with history of stimulant use disorder presented with respiratory failure, responsive to naloxone. High suspicion of primary use of opioids or adulterated stimulants for this reason. Presented with spouse who had similar presentation, using same substances.    # Stimulant Use Disorder, cocaine, severe  - has  and follow up plan  - used adulterated cocaine with fentanyl, unknowingly  - strongly recommend use of fentanyl test strips as harm reduction technique, while following up with  for crack use as previous.    The patient's care was discussed with the Patient and Patient's Family.    I spent 120 minutes on the unit/floor managing the care of Angela Cleary. Over 50% of my time was spent on the following:   Significant education and counseling spent on: how substance use disorders and dependence occur, and how it can become a chronic relapsing and remitting medical condition.  In addition, the pharmacology of medical treatments including fentanyl test strips, the importance of follow up, and Harm Reduction advice on how to use substances in a less harmful way why trying to cut down were discussed today.    # Harm Reduction:  Fentanyl test strips recommended before smoking crack    The patient's care was discussed with the Bedside Nurse, Patient, and Patient's Family.    I spent 120 minutes on the unit/floor managing the care of Amol Cleary. Over 50% of my time was spent on the following:   Significant education and counseling spent on: how substance use disorders and dependence occur, and how it can become a chronic relapsing and remitting medical condition.  In addition, the  pharmacology of medical treatments including fentanyl test strips, the importance of follow up, and Harm Reduction advice on how to use substances in a less harmful way why trying to cut down were discussed today.      Malachi Manley MD  [facility]   Contact information available via [facility] Paging/Directory  Please see sign in/sign out for up to date coverage information    [facility] team (Addiction Consult Team): Coverage daily 8-4pm     ______________________________________________________________________    Chief Complaint   Accidental opioid overdose    History is obtained from the patient    History of Present Illness   Amol Cleary who presented with accidental opioid overdose from smoking crack laced with fentanyl.     Drug/Substance of Choice:  crack cocaine    Opioid use history: none by report    Review of Systems   The 10 point Review of Systems is negative other than noted in the HPI or here.     Past Medical History:   Diagnosis Date    Bipolar 1 disorder (H)        Past Surgical History:   Procedure Laterality Date    BIOPSY CERVICAL, LOCAL EXCISION, SINGLE/MULTIPLE  2005       Social History   Social History     Socioeconomic History    Marital status:      Spouse name: Not on file    Number of children: Not on file    Years of education: Not on file    Highest education level: Not on file   Occupational History    Not on file   Tobacco Use    Smoking status: Every Day     Current packs/day: 0.25     Average packs/day: 0.3 packs/day for 25.9 years (6.5 ttl pk-yrs)     Types: Cigarettes     Start date: 11/6/1998    Smokeless tobacco: Former    Tobacco comments:     Reports smoking e-cigarettes daily.   Substance and Sexual Activity    Alcohol use: Yes    Drug use: Yes     Types: Methamphetamines     Comment: Drug use: states no THC, no heroin, last used [id number]/22/2019    Sexual activity: Yes     Partners: Male     Birth control/protection:  I.U.D.   Other Topics Concern    Not on file   Social History Narrative    Not on file     Social Determinants of Health     Financial Resource Strain: Medium Risk (10/3/2024)    [facility]    Financial Resource Strain     Difficulty of Paying Living Expenses: 1     Difficulty of Paying Living Expenses: 2   Food Insecurity: No Food Insecurity (10/3/2024)    Received from [facility]    Food Insecurity     Worried About Running Out of Food in the Last Year: 1   Transportation Needs: Unmet Transportation Needs (10/3/2024)    Received from [facility]    Transportation Needs     Lack of Transportation (Medical): 2   Physical Activity: Not on file   Stress: Not on file   Social Connections: Socially Isolated (10/3/2024)    Received from [facility]    Social Connections     Frequency of Communication with Friends and Family: 4   Interpersonal Safety: Not on file   Housing Stability: Low Risk  (10/3/2024)    Received from [facility]    Housing Stability     Unable to Pay for Housing in the Last Year: 1       Family History   I have reviewed this patient's family history and updated it with pertinent information if needed.  Family History   Problem Relation Age of Onset    Asthma Mother     Colon Cancer Mother 45.00        part of colon removed    Alcoholism Father     Depression Father     Early Death Father         following stroke    Heart Disease Father     Mental Illness Father     Prostate Cancer Father     Cerebrovascular Disease Father     Asthma Son     Diabetes Maternal Aunt     Cancer Maternal Grandmother     Breast Cancer Paternal Grandmother         remission    Cerebrovascular Disease Paternal Grandmother     Heart Disease Paternal Grandfather     Cerebrovascular Disease Paternal Grandfather          Medications   (Not in a  hospital admission)     Allergies   No Known Allergies    Physical Exam   Temp: 98.6  F (37  C) Temp src: Oral BP: 114/67 Pulse: 104   Resp: 18 SpO2: 100 % O2 Device: None (Room air)      Gen: no acute distress, well nourished, well developed  HEENT: NC/AT, MMM, EOMI,   CV: Extremities WWP, pulses assumed   Resp: breathing comfortably on RA  Ext: No significant deformities or trauma, moving all ext freely  Skin: No erythema, no lesions or rashes.   Neuro: No focal neurologic deficit  Psych: pleasant, alert    Due to regulation of Title 42 of the Code of Federal Regulations (CFR) Part 2: Confidentiality laws apply to this note and the information wherein.  Thus, this note cannot be copy and pasted into any other health care staff's note nor can it be included in general medical records sent to ANY outside agency without the patient's written consent.

## 2024-10-20 ENCOUNTER — HEALTH MAINTENANCE LETTER (OUTPATIENT)
Age: 39
End: 2024-10-20

## 2024-12-22 ENCOUNTER — HEALTH MAINTENANCE LETTER (OUTPATIENT)
Age: 39
End: 2024-12-22